# Patient Record
Sex: MALE | Race: ASIAN | NOT HISPANIC OR LATINO | URBAN - METROPOLITAN AREA
[De-identification: names, ages, dates, MRNs, and addresses within clinical notes are randomized per-mention and may not be internally consistent; named-entity substitution may affect disease eponyms.]

---

## 2024-02-23 ENCOUNTER — EMERGENCY (EMERGENCY)
Facility: HOSPITAL | Age: 64
LOS: 1 days | Discharge: ANOTHER HOSPITAL | End: 2024-02-23
Attending: STUDENT IN AN ORGANIZED HEALTH CARE EDUCATION/TRAINING PROGRAM
Payer: MEDICAID

## 2024-02-23 ENCOUNTER — INPATIENT (INPATIENT)
Facility: HOSPITAL | Age: 64
LOS: 5 days | Discharge: HOME | End: 2024-02-29
Attending: SURGERY | Admitting: SURGERY
Payer: COMMERCIAL

## 2024-02-23 VITALS
DIASTOLIC BLOOD PRESSURE: 95 MMHG | TEMPERATURE: 99 F | RESPIRATION RATE: 18 BRPM | SYSTOLIC BLOOD PRESSURE: 150 MMHG | HEART RATE: 68 BPM | OXYGEN SATURATION: 99 %

## 2024-02-23 VITALS
OXYGEN SATURATION: 97 % | HEART RATE: 69 BPM | SYSTOLIC BLOOD PRESSURE: 109 MMHG | TEMPERATURE: 98 F | RESPIRATION RATE: 18 BRPM | DIASTOLIC BLOOD PRESSURE: 68 MMHG

## 2024-02-23 VITALS
HEART RATE: 61 BPM | SYSTOLIC BLOOD PRESSURE: 126 MMHG | TEMPERATURE: 97 F | OXYGEN SATURATION: 99 % | RESPIRATION RATE: 19 BRPM | DIASTOLIC BLOOD PRESSURE: 81 MMHG | WEIGHT: 179.02 LBS

## 2024-02-23 DIAGNOSIS — N40.0 BENIGN PROSTATIC HYPERPLASIA WITHOUT LOWER URINARY TRACT SYMPTOMS: ICD-10-CM

## 2024-02-23 DIAGNOSIS — K80.50 CALCULUS OF BILE DUCT WITHOUT CHOLANGITIS OR CHOLECYSTITIS WITHOUT OBSTRUCTION: ICD-10-CM

## 2024-02-23 DIAGNOSIS — I10 ESSENTIAL (PRIMARY) HYPERTENSION: ICD-10-CM

## 2024-02-23 DIAGNOSIS — E78.5 HYPERLIPIDEMIA, UNSPECIFIED: ICD-10-CM

## 2024-02-23 LAB
ALBUMIN SERPL ELPH-MCNC: 3.9 G/DL — SIGNIFICANT CHANGE UP (ref 3.5–5)
ALP SERPL-CCNC: 96 U/L — SIGNIFICANT CHANGE UP (ref 40–120)
ALT FLD-CCNC: 196 U/L DA — HIGH (ref 10–60)
ANION GAP SERPL CALC-SCNC: 7 MMOL/L — SIGNIFICANT CHANGE UP (ref 5–17)
AST SERPL-CCNC: 319 U/L — HIGH (ref 10–40)
BASOPHILS # BLD AUTO: 0.03 K/UL — SIGNIFICANT CHANGE UP (ref 0–0.2)
BASOPHILS NFR BLD AUTO: 0.3 % — SIGNIFICANT CHANGE UP (ref 0–2)
BILIRUB SERPL-MCNC: 0.9 MG/DL — SIGNIFICANT CHANGE UP (ref 0.2–1.2)
BUN SERPL-MCNC: 30 MG/DL — HIGH (ref 7–18)
CALCIUM SERPL-MCNC: 9.1 MG/DL — SIGNIFICANT CHANGE UP (ref 8.4–10.5)
CHLORIDE SERPL-SCNC: 102 MMOL/L — SIGNIFICANT CHANGE UP (ref 96–108)
CO2 SERPL-SCNC: 26 MMOL/L — SIGNIFICANT CHANGE UP (ref 22–31)
CREAT SERPL-MCNC: 1.3 MG/DL — SIGNIFICANT CHANGE UP (ref 0.5–1.3)
EGFR: 62 ML/MIN/1.73M2 — SIGNIFICANT CHANGE UP
EOSINOPHIL # BLD AUTO: 0.12 K/UL — SIGNIFICANT CHANGE UP (ref 0–0.5)
EOSINOPHIL NFR BLD AUTO: 1.2 % — SIGNIFICANT CHANGE UP (ref 0–6)
GLUCOSE SERPL-MCNC: 130 MG/DL — HIGH (ref 70–99)
HCT VFR BLD CALC: 41.4 % — SIGNIFICANT CHANGE UP (ref 39–50)
HGB BLD-MCNC: 14.1 G/DL — SIGNIFICANT CHANGE UP (ref 13–17)
IMM GRANULOCYTES NFR BLD AUTO: 0.5 % — SIGNIFICANT CHANGE UP (ref 0–0.9)
LIDOCAIN IGE QN: 51 U/L — SIGNIFICANT CHANGE UP (ref 13–75)
LYMPHOCYTES # BLD AUTO: 1.33 K/UL — SIGNIFICANT CHANGE UP (ref 1–3.3)
LYMPHOCYTES # BLD AUTO: 12.8 % — LOW (ref 13–44)
MCHC RBC-ENTMCNC: 30.9 PG — SIGNIFICANT CHANGE UP (ref 27–34)
MCHC RBC-ENTMCNC: 34.1 GM/DL — SIGNIFICANT CHANGE UP (ref 32–36)
MCV RBC AUTO: 90.8 FL — SIGNIFICANT CHANGE UP (ref 80–100)
MONOCYTES # BLD AUTO: 1 K/UL — HIGH (ref 0–0.9)
MONOCYTES NFR BLD AUTO: 9.6 % — SIGNIFICANT CHANGE UP (ref 2–14)
NEUTROPHILS # BLD AUTO: 7.87 K/UL — HIGH (ref 1.8–7.4)
NEUTROPHILS NFR BLD AUTO: 75.6 % — SIGNIFICANT CHANGE UP (ref 43–77)
NRBC # BLD: 0 /100 WBCS — SIGNIFICANT CHANGE UP (ref 0–0)
PLATELET # BLD AUTO: 220 K/UL — SIGNIFICANT CHANGE UP (ref 150–400)
POTASSIUM SERPL-MCNC: 3.5 MMOL/L — SIGNIFICANT CHANGE UP (ref 3.5–5.3)
POTASSIUM SERPL-SCNC: 3.5 MMOL/L — SIGNIFICANT CHANGE UP (ref 3.5–5.3)
PROT SERPL-MCNC: 7.6 G/DL — SIGNIFICANT CHANGE UP (ref 6–8.3)
RBC # BLD: 4.56 M/UL — SIGNIFICANT CHANGE UP (ref 4.2–5.8)
RBC # FLD: 12.3 % — SIGNIFICANT CHANGE UP (ref 10.3–14.5)
SODIUM SERPL-SCNC: 135 MMOL/L — SIGNIFICANT CHANGE UP (ref 135–145)
TROPONIN I, HIGH SENSITIVITY RESULT: 8.8 NG/L — SIGNIFICANT CHANGE UP
WBC # BLD: 10.4 K/UL — SIGNIFICANT CHANGE UP (ref 3.8–10.5)
WBC # FLD AUTO: 10.4 K/UL — SIGNIFICANT CHANGE UP (ref 3.8–10.5)

## 2024-02-23 PROCEDURE — 76705 ECHO EXAM OF ABDOMEN: CPT

## 2024-02-23 PROCEDURE — 80053 COMPREHEN METABOLIC PANEL: CPT

## 2024-02-23 PROCEDURE — 85025 COMPLETE CBC W/AUTO DIFF WBC: CPT

## 2024-02-23 PROCEDURE — 99223 1ST HOSP IP/OBS HIGH 75: CPT

## 2024-02-23 PROCEDURE — 74177 CT ABD & PELVIS W/CONTRAST: CPT | Mod: MC

## 2024-02-23 PROCEDURE — 84484 ASSAY OF TROPONIN QUANT: CPT

## 2024-02-23 PROCEDURE — 76705 ECHO EXAM OF ABDOMEN: CPT | Mod: 26

## 2024-02-23 PROCEDURE — 99285 EMERGENCY DEPT VISIT HI MDM: CPT

## 2024-02-23 PROCEDURE — 74177 CT ABD & PELVIS W/CONTRAST: CPT | Mod: 26,MC

## 2024-02-23 PROCEDURE — 99285 EMERGENCY DEPT VISIT HI MDM: CPT | Mod: 25

## 2024-02-23 PROCEDURE — 99222 1ST HOSP IP/OBS MODERATE 55: CPT | Mod: GC

## 2024-02-23 PROCEDURE — 36415 COLL VENOUS BLD VENIPUNCTURE: CPT

## 2024-02-23 PROCEDURE — 96375 TX/PRO/DX INJ NEW DRUG ADDON: CPT

## 2024-02-23 PROCEDURE — 83690 ASSAY OF LIPASE: CPT

## 2024-02-23 PROCEDURE — 96374 THER/PROPH/DIAG INJ IV PUSH: CPT | Mod: XU

## 2024-02-23 RX ORDER — AMLODIPINE BESYLATE 2.5 MG/1
5 TABLET ORAL DAILY
Refills: 0 | Status: DISCONTINUED | OUTPATIENT
Start: 2024-02-24 | End: 2024-02-29

## 2024-02-23 RX ORDER — ASPIRIN/CALCIUM CARB/MAGNESIUM 324 MG
162 TABLET ORAL DAILY
Refills: 0 | Status: DISCONTINUED | OUTPATIENT
Start: 2024-02-23 | End: 2024-02-26

## 2024-02-23 RX ORDER — TAMSULOSIN HYDROCHLORIDE 0.4 MG/1
0.4 CAPSULE ORAL AT BEDTIME
Refills: 0 | Status: DISCONTINUED | OUTPATIENT
Start: 2024-02-23 | End: 2024-02-29

## 2024-02-23 RX ORDER — ONDANSETRON 8 MG/1
4 TABLET, FILM COATED ORAL EVERY 8 HOURS
Refills: 0 | Status: DISCONTINUED | OUTPATIENT
Start: 2024-02-23 | End: 2024-02-29

## 2024-02-23 RX ORDER — OXYCODONE AND ACETAMINOPHEN 5; 325 MG/1; MG/1
1 TABLET ORAL EVERY 6 HOURS
Refills: 0 | Status: DISCONTINUED | OUTPATIENT
Start: 2024-02-23 | End: 2024-02-29

## 2024-02-23 RX ORDER — AMLODIPINE BESYLATE 2.5 MG/1
1 TABLET ORAL
Refills: 0 | DISCHARGE

## 2024-02-23 RX ORDER — FAMOTIDINE 10 MG/ML
20 INJECTION INTRAVENOUS DAILY
Refills: 0 | Status: DISCONTINUED | OUTPATIENT
Start: 2024-02-23 | End: 2024-02-29

## 2024-02-23 RX ORDER — ATORVASTATIN CALCIUM 80 MG/1
1 TABLET, FILM COATED ORAL
Refills: 0 | DISCHARGE

## 2024-02-23 RX ORDER — PANTOPRAZOLE SODIUM 20 MG/1
40 TABLET, DELAYED RELEASE ORAL ONCE
Refills: 0 | Status: COMPLETED | OUTPATIENT
Start: 2024-02-23 | End: 2024-02-23

## 2024-02-23 RX ORDER — ENOXAPARIN SODIUM 100 MG/ML
40 INJECTION SUBCUTANEOUS EVERY 24 HOURS
Refills: 0 | Status: DISCONTINUED | OUTPATIENT
Start: 2024-02-23 | End: 2024-02-29

## 2024-02-23 RX ORDER — FAMOTIDINE 10 MG/ML
1 INJECTION INTRAVENOUS
Refills: 0 | DISCHARGE

## 2024-02-23 RX ORDER — LANOLIN ALCOHOL/MO/W.PET/CERES
3 CREAM (GRAM) TOPICAL AT BEDTIME
Refills: 0 | Status: DISCONTINUED | OUTPATIENT
Start: 2024-02-23 | End: 2024-02-29

## 2024-02-23 RX ORDER — CEFTRIAXONE 500 MG/1
1000 INJECTION, POWDER, FOR SOLUTION INTRAMUSCULAR; INTRAVENOUS ONCE
Refills: 0 | Status: COMPLETED | OUTPATIENT
Start: 2024-02-23 | End: 2024-02-23

## 2024-02-23 RX ORDER — ACETAMINOPHEN 500 MG
650 TABLET ORAL EVERY 6 HOURS
Refills: 0 | Status: DISCONTINUED | OUTPATIENT
Start: 2024-02-23 | End: 2024-02-29

## 2024-02-23 RX ORDER — SODIUM CHLORIDE 9 MG/ML
1000 INJECTION INTRAMUSCULAR; INTRAVENOUS; SUBCUTANEOUS ONCE
Refills: 0 | Status: COMPLETED | OUTPATIENT
Start: 2024-02-23 | End: 2024-02-23

## 2024-02-23 RX ORDER — TAMSULOSIN HYDROCHLORIDE 0.4 MG/1
1 CAPSULE ORAL
Refills: 0 | DISCHARGE

## 2024-02-23 RX ADMIN — Medication 162 MILLIGRAM(S): at 03:57

## 2024-02-23 RX ADMIN — CEFTRIAXONE 100 MILLIGRAM(S): 500 INJECTION, POWDER, FOR SOLUTION INTRAMUSCULAR; INTRAVENOUS at 10:06

## 2024-02-23 RX ADMIN — TAMSULOSIN HYDROCHLORIDE 0.4 MILLIGRAM(S): 0.4 CAPSULE ORAL at 23:49

## 2024-02-23 RX ADMIN — SODIUM CHLORIDE 1000 MILLILITER(S): 9 INJECTION INTRAMUSCULAR; INTRAVENOUS; SUBCUTANEOUS at 10:07

## 2024-02-23 RX ADMIN — PANTOPRAZOLE SODIUM 40 MILLIGRAM(S): 20 TABLET, DELAYED RELEASE ORAL at 03:57

## 2024-02-23 NOTE — H&P ADULT - HISTORY OF PRESENT ILLNESS
Transfer from [place] for GI evaluation   63 yr old male with a pmh of HTN, HLD BPH who presents with upper abdominal pain/epigastric pain that started at 1am "exploding" in nature 10 in nature radiating to the rest of his abdomen. He has associated SOB with the pain.   Denies  headache, dizziness, chest pain, palpitations, joint pain, diarrhea/constipation, urinary symptoms.  Vitals: T 99.3, HR 68, /95, RR 18 satting 99% RA

## 2024-02-23 NOTE — ED ADULT NURSE NOTE - OBJECTIVE STATEMENT
Patient presented to ED c/o abdominal pain/ epigastric pain- no nausea/ no c/o vomiting, no diarrhea/constipation. Denies chest pain. No SOB.

## 2024-02-23 NOTE — H&P ADULT - NSHPREVIEWOFSYSTEMS_GEN_ALL_CORE
REVIEW OF SYSTEMS:    CONSTITUTIONAL: No weakness, fevers or chills  EYES/ENT: No visual changes;  No dysphagia; No sore throat; No rhinorrhea; No sinus pain/pressure  NECK: No pain or stiffness  RESPIRATORY: No cough, wheezing, hemoptysis; + shortness of breath  CARDIOVASCULAR: No chest pain or palpitations; No lower extremity edema  GASTROINTESTINAL: + abdominal/ epigastric pain. No nausea, vomiting, or hematemesis; No diarrhea or constipation. No melena or hematochezia.  GENITOURINARY: No dysuria, frequency or hematuria  NEUROLOGICAL: No numbness or weakness  MSK: ambulates with out assistance   SKIN: No itching, burning, rashes, or lesions   All other review of systems is negative unless indicated above.

## 2024-02-23 NOTE — H&P ADULT - PROBLEM SELECTOR PLAN 1
New  CT A/P notable for distal CBD stone  GI consulted:  EUS/ERCP tentatively 2/26 pending endoscopy availability  - NPO after MN 2/25  - please order labs (CBC, CMP, INR) for 4 AM on 2/26 so that results will be available early morning; replete lytes and transfuse as needed  - low fat diet as tolerated for now

## 2024-02-23 NOTE — H&P ADULT - NSHPLABSRESULTS_GEN_ALL_CORE
Patent 14.1   10.40 )-----------( 220      ( 23 Feb 2024 04:04 )             41.4     135  |  102  |  30<H>  ----------------------------<  130<H>     02-23  3.5   |  26  |  1.30    Ca    9.1      23 Feb 2024 04:04    TPro  7.6  /  Alb  3.9  /  TBili  0.9  /  DBili  x   /  AST  319<H>  /  ALT  196<H>  /  AlkPhos  96  02-23    images interpreted by radiology:   CT abdo/pelvis: Hepatic steatosis. 5 mm distal CBD stone with minimal biliary dilatation.  RUQ ultrasound: Downstream common bile duct which contained a stone on the CT earlier the   same day is not seen on ultrasound secondary to overlying bowel gas. Visualized common bile duct within normal caliber measuring 5 mm.

## 2024-02-23 NOTE — ED PROVIDER NOTE - CLINICAL SUMMARY MEDICAL DECISION MAKING FREE TEXT BOX
Cantonese speaking otherwise healthy male with no medical issues or surgical history presents to the emergency department as transfer from outside hospital for GI for possible choledocholithiasis.  Patient states that he awoke around 1 AM with sudden onset of epigastric pain radiating bilaterally to upper quadrants into his back, associated nausea but no vomiting, no fevers.  I reviewed the ultrasound, CT scan, labs from outside hospital showing elevated LFTs, no CBD dilation but CT scan showing approximate 5 mm distal CBD stone, ultrasound was performed there was unable to visualize the stone secondary to overlying bowel gas.  Patient received pantoprazole, ceftriaxone at outside hospital, he is currently pain-free and has no tenderness on exam.  Possible passed stone, will repeat ultrasound here as well as repeat LFTs and monitor for any recurrence of pain.  Overall patient very well-appearing, GI consulted.     [place]  ID [id number] Cantonese speaking otherwise healthy male with no medical issues or surgical history presents to the emergency department as transfer from outside hospital for GI for possible choledocholithiasis.  Patient states that he awoke around 1 AM with sudden onset of epigastric pain radiating bilaterally to upper quadrants into his back, associated nausea but no vomiting, no fevers.  I reviewed the ultrasound, CT scan, labs from outside hospital showing elevated LFTs, no CBD dilation but CT scan showing approximate 5 mm distal CBD stone, ultrasound was performed there was unable to visualize the stone secondary to overlying bowel gas.  Patient received pantoprazole, ceftriaxone at outside hospital, he is currently pain-free and has no tenderness on exam.  Possible passed stone, will  repeat LFTs and monitor for any recurrence of pain.  Overall patient very well-appearing, GI consulted.     [place]  ID [id number] Cantonese speaking otherwise healthy male with no medical issues or surgical history presents to the emergency department as transfer from outside hospital for GI for possible choledocholithiasis.  Patient states that he awoke around 1 AM with sudden onset of epigastric pain radiating bilaterally to upper quadrants into his back, associated nausea but no vomiting, no fevers.  I reviewed the ultrasound, CT scan, labs from outside hospital showing elevated LFTs, no CBD dilation but CT scan showing approximate 5 mm distal CBD stone, ultrasound was performed there was unable to visualize the stone secondary to overlying bowel gas.  Patient received pantoprazole, ceftriaxone at outside hospital, he is currently pain-free and has no tenderness on exam.   Overall patient very well-appearing, GI consulted.     ticketea  ID [id number]

## 2024-02-23 NOTE — ED PROVIDER NOTE - PROGRESS NOTE DETAILS
63-year-old male signed out at 7 AM pending consultant recommendations.  Patient has a CBD stone with 5 mm of dilation.  Pain is controlled however no GI is available for ERCP and patient will require transfer.

## 2024-02-23 NOTE — ED PROVIDER NOTE - CLINICAL SUMMARY MEDICAL DECISION MAKING FREE TEXT BOX
patient with epigastric pain, will evel lipase, trop, ekg to eval for CAD and epigastric pain, no RUQ tenderness,     lfts increased so will ct

## 2024-02-23 NOTE — ED PROVIDER NOTE - PHYSICAL EXAMINATION
GEN - NAD; well appearing; A+O x3   HEAD - NC/AT   EYES- PERRL, EOMI  ENT: Airway patent, mmm  PULMONARY - CTA b/l, symmetric breath sounds.   CARDIAC -s1s2, RRR, no M,G,R  ABDOMEN - +BS, ND, NT, soft, no guarding, no rebound, no masses   EXTREMITIES - FROM  NEUROLOGIC - alert, speech clear  PSYCH -nl mood/affect, nl insight.

## 2024-02-23 NOTE — ED ADULT TRIAGE NOTE - WEIGHT IN KG
81.2 Name And Contact Information For Health Care Proxy: lCara Rajput significant other [phone] Name And Contact Information For Health Care Proxy: Clara Rajput significant other [phone]

## 2024-02-23 NOTE — ED ADULT NURSE NOTE - CHIEF COMPLAINT QUOTE
Pt reporting to the ED as a transfer form [facility] for GI consults. Pt reports abdominal pain CT indicating stone in bile duct. pmh HTN, BPH.

## 2024-02-23 NOTE — CONSULT NOTE ADULT - ASSESSMENT
Impression:  #choledocholithiasis seen on CT A/P w/o e/o cholangitis    Recommendations:  - EUS/ERCP tentatively 2/26 pending endoscopy availability  - NPO after MN 2/25  - please order labs (CBC, CMP, INR) for 4 AM on 2/26 so that results will be available early morning; replete lytes and transfuse as needed  - low fat diet as tolerated for now  - antiemetics and pain control per primary team  - monitor for fevers and maintain low threshold for antibiotics  - monitor CMP, CBC daily  - appreciate surgery input re: timing of CCY    **THIS NOTE IS NOT FINALIZED UNTIL SIGNED BY THE ATTENDING**    Maya Sanchez MD  GI Fellow, PGY-6  Available via [employer] Teams    NON-URGENT CONSULTS:  Please email [email or web address]  [email or web address]  AT NIGHT AND ON WEEKENDS:  Contact on-call GI fellow via answering service ([phone]) from 5pm-8am and on weekends/holidays  MONDAY-FRIDAY 8AM-5PM:  Pager# [phone] (JENNIFER) or [phone] ([facility])   62 yo Cantonese speaking M [facility] HLD, HTN p/w acute onset epigastric pain x1 day to [facility] 2/23. Noted to have normal WBC, Normal Tbili/ALP; , . CT A/P notable for distal CBD stone. Was transferred to [facility] for advanced GI eval.     Impression:  #choledocholithiasis seen on CT A/P w/o e/o cholangitis    Recommendations:  - EUS/ERCP tentatively 2/26 pending endoscopy availability  - NPO after MN 2/25  - please order labs (CBC, CMP, INR) for 4 AM on 2/26 so that results will be available early morning; replete lytes and transfuse as needed  - low fat diet as tolerated for now  - antiemetics and pain control per primary team  - monitor for fevers and maintain low threshold for antibiotics  - monitor CMP, CBC daily  - appreciate surgery input re: eval for CCY    **THIS NOTE IS NOT FINALIZED UNTIL SIGNED BY THE ATTENDING**    Maya Sanchez MD  GI Fellow, PGY-6  Available via [employer] Teams    NON-URGENT CONSULTS:  Please email [email or web address]  [email or web address]  AT NIGHT AND ON WEEKENDS:  Contact on-call GI fellow via answering service ([phone]) from 5pm-8am and on weekends/holidays  MONDAY-FRIDAY 8AM-5PM:  Pager# [phone] ([facility]) or [phone] ([facility])

## 2024-02-23 NOTE — ED ADULT TRIAGE NOTE - CHIEF COMPLAINT QUOTE
Pt reporting to the ED as a transfer form [facility] for GI consults. Pt reports abdominal pain CT indicating stone in bile duct. pmh HTN, BPH.

## 2024-02-23 NOTE — CONSULT NOTE ADULT - ASSESSMENT
44 year old male with choledocholithiasis, Tbili WNL, elevated LFTs, Afebrile with no WBC. No signs of cholangitis at this time.     - recommend GI consult for ERCP  - recommend transfer for GI, transfer initiated by ED  - monitor LFT's  - will likely need laparoscopic cholecystectomy after ERCP   - NPO for possible procedure with GI   - discussed with patient and Dr. Giron

## 2024-02-23 NOTE — ED PROVIDER NOTE - OBJECTIVE STATEMENT
63-year-old  male with hypertension high cholesterol presents with epigastric pain.  Patient said he was diagnosed with gastritis and started on famotidine last month however his pain was severe today.  He denies chest pain trouble breathing nausea lightheadedness diaphoresis smoking cigarettes coronary artery disease

## 2024-02-23 NOTE — CONSULT NOTE ADULT - SUBJECTIVE AND OBJECTIVE BOX
Chief Complaint:  Patient is a 63y old  Male who presents with a chief complaint of     HPI: 62 yo Cantonese speaking M PMH HLD, HTN p/w epigastric pain to [facility] 2/23. Noted to have normal WBC, Normal Tbili/ALP; , . CT A/P notable for distal CBD stone. Was transferred to [facility] for advanced GI eval.     Otherwise, patient denies fevers, chills, weight loss, dysphagia, odynophagia, early satiety, poor oral intake, abdominal pain, nausea, vomiting, diarrhea, melena, hematemesis, hematochezia, change in stool caliber, or family history of GI-related cancers.    Allergies:  No Known Allergies      Home Medications:    Hospital Medications:      PMHX/PSHX:      Family history:   Denies any family history of GI-related disease or cancers.    Social History:   ETOH: denies  Tobacco: denies  Illicit drug use: denies    ROS: 14 point ROS negative unless otherwise stated in HPI      Vital Signs:  Vital Signs Last 24 Hrs  T(C): 37.4 (23 Feb 2024 15:12), Max: 37.4 (23 Feb 2024 15:12)  T(F): 99.3 (23 Feb 2024 15:12), Max: 99.3 (23 Feb 2024 15:12)  HR: 68 (23 Feb 2024 15:12) (61 - 74)  BP: 150/95 (23 Feb 2024 15:12) (109/68 - 150/95)  BP(mean): --  RR: 18 (23 Feb 2024 15:12) (18 - 19)  SpO2: 99% (23 Feb 2024 15:12) (97% - 99%)    Parameters below as of 23 Feb 2024 15:12  Patient On (Oxygen Delivery Method): room air      Daily     Daily     PHYSICAL EXAM:     GENERAL:  Appears stated age, well-groomed, well-nourished, no distress  HEENT:  NC/AT,  conjunctivae clear and pink  CHEST:  Full & symmetric excursion, no increased effort  HEART:  Regular rhythm, S1, S2, no murmur/rub/S3/S4  ABDOMEN:  Soft, +epigastric-tender, non-distended  EXTREMITIES:  no cyanosis,clubbing or edema  SKIN:  No rash/erythema/ecchymoses/petechiae/wounds/abscess/warm/dry  NEURO:  Alert, orientedx3      LABS:                        14.1   10.40 )-----------( 220      ( 23 Feb 2024 04:04 )             41.4     02-23    135  |  102  |  30<H>  ----------------------------<  130<H>  3.5   |  26  |  1.30    Ca    9.1      23 Feb 2024 04:04    TPro  7.6  /  Alb  3.9  /  TBili  0.9  /  DBili  x   /  AST  319<H>  /  ALT  196<H>  /  AlkPhos  96  02-23    LIVER FUNCTIONS - ( 23 Feb 2024 04:04 )  Alb: 3.9 g/dL / Pro: 7.6 g/dL / ALK PHOS: 96 U/L / ALT: 196 U/L DA / AST: 319 U/L / GGT: x             Urinalysis Basic - ( 23 Feb 2024 04:04 )    Color: x / Appearance: x / SG: x / pH: x  Gluc: 130 mg/dL / Ketone: x  / Bili: x / Urobili: x   Blood: x / Protein: x / Nitrite: x   Leuk Esterase: x / RBC: x / WBC x   Sq Epi: x / Non Sq Epi: x / Bacteria: x      Amylase Serum--      Lipase serum51       Ammonia--      Imaging:   ACC: [id number] EXAM:  US ABDOMEN RT UPR QUADRANT   ORDERED BY: MARGARET PALMA     PROCEDURE DATE:  02/23/2024          INTERPRETATION:  CLINICAL INFORMATION: CBD stone. Elevated LFTs.    COMPARISON: CT abdomen/pelvis of the same day.    TECHNIQUE: Sonography of the right upper quadrant. The examination is   technically limited secondary to overlying bowel gas.    FINDINGS:  Liver: Within normal limits.  Bile ducts: Normal caliber. Common bile duct measures 5 mm. The   downstream common bile duct in the region of the ampulla and not seen   secondary to overlying bowel gas.  Gallbladder: Within normal limits.  Pancreas: Limited. Visualized portions are within normal limits.  Right kidney: 10.7 cm. No hydronephrosis.  Ascites: None.  IVC andaorta: Visualized portions normal in caliber.    IMPRESSION:    Downstream common bile duct which contained a stone on the CT earlier the   same day is not seen on ultrasound secondary to overlying bowel gas.    Visualized common bile duct within normal caliber measuring 5 mm.        ACC: [id number] EXAM:  CT ABDOMEN AND PELVIS IC   ORDERED BY: MARGARET PALMA     PROCEDURE DATE:  02/23/2024          INTERPRETATION:  CLINICAL INFORMATION: Epigastric pain, elevated LFTs    COMPARISON: None.    CONTRAST/COMPLICATIONS:  IV Contrast: Omnipaque 350  90 cc administered   10 cc discarded  Oral Contrast: NONE  Complications: None reported at time of study completion    PROCEDURE:  CT of the Abdomen and Pelvis was performed.  Sagittal and coronal reformats were performed.    FINDINGS:  LOWER CHEST: Within normal limits.    LIVER: Hepatic steatosis. Multiple tiny hypodense lesions too small to   characterize.  BILE DUCTS: Minimal intrahepatic biliary dilatation. 5 mm sized calcific   density in the distal common bile duct (6-80).  GALLBLADDER: Within normal limits.  SPLEEN: Within normal limits.  PANCREAS: Within normal limits.  ADRENALS: Within normal limits.  KIDNEYS/URETERS: Tiny right renal cysts.    BLADDER: Within normal limits.  REPRODUCTIVE ORGANS: Mild prostatic enlargement.    BOWEL: No bowel obstruction. Appendix is normal.  PERITONEUM: No ascites.  VESSELS: Minimal atherosclerotic disease.  RETROPERITONEUM/LYMPH NODES: No lymphadenopathy.  ABDOMINAL WALL: Within normal limits.  BONES: Discogenic disease most prominent at L3-4 and L4-5.    IMPRESSION:  Hepatic steatosis.  5 mm distal CBD stone with minimal biliary dilatation.       Chief Complaint:  Patient is a 63y old  Male who presents with a chief complaint of     HPI: 62 yo Cantonese speaking M PMH HLD, HTN p/w acute onset epigastric pain x1 day to [facility] 2/23. Noted to have normal WBC, Normal Tbili/ALP; , . CT A/P notable for distal CBD stone. Was transferred to [facility] for advanced GI eval.     Otherwise, patient denies fevers, chills, weight loss, dysphagia, odynophagia, early satiety, poor oral intake, abdominal pain, nausea, vomiting, diarrhea, melena, hematemesis, hematochezia, change in stool caliber, or family history of GI-related cancers.    Allergies:  No Known Allergies      Home Medications:    Hospital Medications:      PMHX/PSHX:      Family history:   Denies any family history of GI-related disease or cancers.    Social History:   ETOH: denies  Tobacco: denies  Illicit drug use: denies    ROS: 14 point ROS negative unless otherwise stated in HPI      Vital Signs:  Vital Signs Last 24 Hrs  T(C): 37.4 (23 Feb 2024 15:12), Max: 37.4 (23 Feb 2024 15:12)  T(F): 99.3 (23 Feb 2024 15:12), Max: 99.3 (23 Feb 2024 15:12)  HR: 68 (23 Feb 2024 15:12) (61 - 74)  BP: 150/95 (23 Feb 2024 15:12) (109/68 - 150/95)  BP(mean): --  RR: 18 (23 Feb 2024 15:12) (18 - 19)  SpO2: 99% (23 Feb 2024 15:12) (97% - 99%)    Parameters below as of 23 Feb 2024 15:12  Patient On (Oxygen Delivery Method): room air      Daily     Daily     PHYSICAL EXAM:     GENERAL:  Appears stated age, well-groomed, well-nourished, no distress  HEENT:  NC/AT,  conjunctivae clear and pink  CHEST:  Full & symmetric excursion, no increased effort  HEART:  Regular rhythm, S1, S2, no murmur/rub/S3/S4  ABDOMEN:  Soft, +epigastric-tender, non-distended  EXTREMITIES:  no cyanosis,clubbing or edema  SKIN:  No rash/erythema/ecchymoses/petechiae/wounds/abscess/warm/dry  NEURO:  Alert, orientedx3      LABS:                        14.1   10.40 )-----------( 220      ( 23 Feb 2024 04:04 )             41.4     02-23    135  |  102  |  30<H>  ----------------------------<  130<H>  3.5   |  26  |  1.30    Ca    9.1      23 Feb 2024 04:04    TPro  7.6  /  Alb  3.9  /  TBili  0.9  /  DBili  x   /  AST  319<H>  /  ALT  196<H>  /  AlkPhos  96  02-23    LIVER FUNCTIONS - ( 23 Feb 2024 04:04 )  Alb: 3.9 g/dL / Pro: 7.6 g/dL / ALK PHOS: 96 U/L / ALT: 196 U/L DA / AST: 319 U/L / GGT: x             Urinalysis Basic - ( 23 Feb 2024 04:04 )    Color: x / Appearance: x / SG: x / pH: x  Gluc: 130 mg/dL / Ketone: x  / Bili: x / Urobili: x   Blood: x / Protein: x / Nitrite: x   Leuk Esterase: x / RBC: x / WBC x   Sq Epi: x / Non Sq Epi: x / Bacteria: x      Amylase Serum--      Lipase serum51       Ammonia--      Imaging:   ACC: [id number] EXAM:  US ABDOMEN RT UPR QUADRANT   ORDERED BY: MARGARET PALMA     PROCEDURE DATE:  02/23/2024          INTERPRETATION:  CLINICAL INFORMATION: CBD stone. Elevated LFTs.    COMPARISON: CT abdomen/pelvis of the same day.    TECHNIQUE: Sonography of the right upper quadrant. The examination is   technically limited secondary to overlying bowel gas.    FINDINGS:  Liver: Within normal limits.  Bile ducts: Normal caliber. Common bile duct measures 5 mm. The   downstream common bile duct in the region of the ampulla and not seen   secondary to overlying bowel gas.  Gallbladder: Within normal limits.  Pancreas: Limited. Visualized portions are within normal limits.  Right kidney: 10.7 cm. No hydronephrosis.  Ascites: None.  IVC andaorta: Visualized portions normal in caliber.    IMPRESSION:    Downstream common bile duct which contained a stone on the CT earlier the   same day is not seen on ultrasound secondary to overlying bowel gas.    Visualized common bile duct within normal caliber measuring 5 mm.        ACC: [id number] EXAM:  CT ABDOMEN AND PELVIS IC   ORDERED BY: MARGARET PALMA     PROCEDURE DATE:  02/23/2024          INTERPRETATION:  CLINICAL INFORMATION: Epigastric pain, elevated LFTs    COMPARISON: None.    CONTRAST/COMPLICATIONS:  IV Contrast: Omnipaque 350  90 cc administered   10 cc discarded  Oral Contrast: NONE  Complications: None reported at time of study completion    PROCEDURE:  CT of the Abdomen and Pelvis was performed.  Sagittal and coronal reformats were performed.    FINDINGS:  LOWER CHEST: Within normal limits.    LIVER: Hepatic steatosis. Multiple tiny hypodense lesions too small to   characterize.  BILE DUCTS: Minimal intrahepatic biliary dilatation. 5 mm sized calcific   density in the distal common bile duct (6-80).  GALLBLADDER: Within normal limits.  SPLEEN: Within normal limits.  PANCREAS: Within normal limits.  ADRENALS: Within normal limits.  KIDNEYS/URETERS: Tiny right renal cysts.    BLADDER: Within normal limits.  REPRODUCTIVE ORGANS: Mild prostatic enlargement.    BOWEL: No bowel obstruction. Appendix is normal.  PERITONEUM: No ascites.  VESSELS: Minimal atherosclerotic disease.  RETROPERITONEUM/LYMPH NODES: No lymphadenopathy.  ABDOMINAL WALL: Within normal limits.  BONES: Discogenic disease most prominent at L3-4 and L4-5.    IMPRESSION:  Hepatic steatosis.  5 mm distal CBD stone with minimal biliary dilatation.       Chief Complaint:  Patient is a 63y old  Male who presents with a chief complaint of     Cantonese  #[id number] was used  HPI: 64 yo Cantonese speaking M PMH HLD, HTN p/w acute onset epigastric pain x1 day to [facility] 2/23. Noted to have normal WBC, Normal Tbili/ALP; , . CT A/P notable for distal CBD stone. Was transferred to [facility] for advanced GI eval.     Otherwise, patient denies fevers, chills, weight loss, dysphagia, odynophagia, early satiety, poor oral intake, abdominal pain, nausea, vomiting, diarrhea, melena, hematemesis, hematochezia, change in stool caliber, or family history of GI-related cancers.    Allergies:  No Known Allergies      Home Medications:    Hospital Medications:      PMHX/PSHX:      Family history:   Denies any family history of GI-related disease or cancers.    Social History:   ETOH: denies  Tobacco: denies  Illicit drug use: denies    ROS: 14 point ROS negative unless otherwise stated in HPI      Vital Signs:  Vital Signs Last 24 Hrs  T(C): 37.4 (23 Feb 2024 15:12), Max: 37.4 (23 Feb 2024 15:12)  T(F): 99.3 (23 Feb 2024 15:12), Max: 99.3 (23 Feb 2024 15:12)  HR: 68 (23 Feb 2024 15:12) (61 - 74)  BP: 150/95 (23 Feb 2024 15:12) (109/68 - 150/95)  BP(mean): --  RR: 18 (23 Feb 2024 15:12) (18 - 19)  SpO2: 99% (23 Feb 2024 15:12) (97% - 99%)    Parameters below as of 23 Feb 2024 15:12  Patient On (Oxygen Delivery Method): room air      Daily     Daily     PHYSICAL EXAM:     GENERAL:  Appears stated age, well-groomed, well-nourished, no distress  HEENT:  NC/AT,  conjunctivae clear and pink  CHEST:  Full & symmetric excursion, no increased effort  HEART:  Regular rhythm, S1, S2, no murmur/rub/S3/S4  ABDOMEN:  Soft, +epigastric-tender, non-distended  EXTREMITIES:  no cyanosis,clubbing or edema  SKIN:  No rash/erythema/ecchymoses/petechiae/wounds/abscess/warm/dry  NEURO:  Alert, orientedx3      LABS:                        14.1   10.40 )-----------( 220      ( 23 Feb 2024 04:04 )             41.4     02-23    135  |  102  |  30<H>  ----------------------------<  130<H>  3.5   |  26  |  1.30    Ca    9.1      23 Feb 2024 04:04    TPro  7.6  /  Alb  3.9  /  TBili  0.9  /  DBili  x   /  AST  319<H>  /  ALT  196<H>  /  AlkPhos  96  02-23    LIVER FUNCTIONS - ( 23 Feb 2024 04:04 )  Alb: 3.9 g/dL / Pro: 7.6 g/dL / ALK PHOS: 96 U/L / ALT: 196 U/L DA / AST: 319 U/L / GGT: x             Urinalysis Basic - ( 23 Feb 2024 04:04 )    Color: x / Appearance: x / SG: x / pH: x  Gluc: 130 mg/dL / Ketone: x  / Bili: x / Urobili: x   Blood: x / Protein: x / Nitrite: x   Leuk Esterase: x / RBC: x / WBC x   Sq Epi: x / Non Sq Epi: x / Bacteria: x      Amylase Serum--      Lipase serum51       Ammonia--      Imaging:   ACC: [id number] EXAM:  US ABDOMEN RT UPR QUADRANT   ORDERED BY: MARGARET PALMA     PROCEDURE DATE:  02/23/2024          INTERPRETATION:  CLINICAL INFORMATION: CBD stone. Elevated LFTs.    COMPARISON: CT abdomen/pelvis of the same day.    TECHNIQUE: Sonography of the right upper quadrant. The examination is   technically limited secondary to overlying bowel gas.    FINDINGS:  Liver: Within normal limits.  Bile ducts: Normal caliber. Common bile duct measures 5 mm. The   downstream common bile duct in the region of the ampulla and not seen   secondary to overlying bowel gas.  Gallbladder: Within normal limits.  Pancreas: Limited. Visualized portions are within normal limits.  Right kidney: 10.7 cm. No hydronephrosis.  Ascites: None.  IVC andaorta: Visualized portions normal in caliber.    IMPRESSION:    Downstream common bile duct which contained a stone on the CT earlier the   same day is not seen on ultrasound secondary to overlying bowel gas.    Visualized common bile duct within normal caliber measuring 5 mm.        ACC: [id number] EXAM:  CT ABDOMEN AND PELVIS IC   ORDERED BY: MARGARET PALMA     PROCEDURE DATE:  02/23/2024          INTERPRETATION:  CLINICAL INFORMATION: Epigastric pain, elevated LFTs    COMPARISON: None.    CONTRAST/COMPLICATIONS:  IV Contrast: Omnipaque 350  90 cc administered   10 cc discarded  Oral Contrast: NONE  Complications: None reported at time of study completion    PROCEDURE:  CT of the Abdomen and Pelvis was performed.  Sagittal and coronal reformats were performed.    FINDINGS:  LOWER CHEST: Within normal limits.    LIVER: Hepatic steatosis. Multiple tiny hypodense lesions too small to   characterize.  BILE DUCTS: Minimal intrahepatic biliary dilatation. 5 mm sized calcific   density in the distal common bile duct (6-80).  GALLBLADDER: Within normal limits.  SPLEEN: Within normal limits.  PANCREAS: Within normal limits.  ADRENALS: Within normal limits.  KIDNEYS/URETERS: Tiny right renal cysts.    BLADDER: Within normal limits.  REPRODUCTIVE ORGANS: Mild prostatic enlargement.    BOWEL: No bowel obstruction. Appendix is normal.  PERITONEUM: No ascites.  VESSELS: Minimal atherosclerotic disease.  RETROPERITONEUM/LYMPH NODES: No lymphadenopathy.  ABDOMINAL WALL: Within normal limits.  BONES: Discogenic disease most prominent at L3-4 and L4-5.    IMPRESSION:  Hepatic steatosis.  5 mm distal CBD stone with minimal biliary dilatation.

## 2024-02-23 NOTE — CONSULT NOTE ADULT - SUBJECTIVE AND OBJECTIVE BOX
HPI:  63 year old male with PMH HTN, HLD, BPH, and gastritis presents to ED due to sever epigastric pain. He denies current abdominal pain, nausea, vomiting, fever, chills, jaundice. Denies PSH.     Interpretter: Gianni Sena [id number] - Martin    PAST MEDICAL & SURGICAL HISTORY:    Review of Systems: Contained within HPI     MEDICATIONS  (STANDING):  aspirin  chewable 162 milliGRAM(s) Oral daily    MEDICATIONS  (PRN):    Allergies: No Known Allergies    FAMILY HISTORY:  Vital Signs Last 24 Hrs  T(C): 37.1 (23 Feb 2024 07:51), Max: 37.1 (23 Feb 2024 07:51)  T(F): 98.7 (23 Feb 2024 07:51), Max: 98.7 (23 Feb 2024 07:51)  HR: 73 (23 Feb 2024 07:51) (61 - 73)  BP: 134/84 (23 Feb 2024 07:51) (126/81 - 134/84)  RR: 18 (23 Feb 2024 07:51) (18 - 19)  SpO2: 97% (23 Feb 2024 07:51) (97% - 99%)  Parameters below as of 23 Feb 2024 03:28  Patient On (Oxygen Delivery Method): room air    Physical Exam:  General:  Appears stated age, well-groomed, well-nourished, no distress  Eyes: EOMI  HENT:  WNL, no JVD  Chest: respirations nonlabored  Cardiovascular:  Regular rate & rhythm  Abdomen:  soft, NT/ND  Extremities: no edema bilaterally  Skin: warm and dry  Musculoskeletal: no calf tenderness  Neuro:  Alert, oriented to time, place and person   Psych: normal affect    LABS:                        14.1   10.40 )-----------( 220      ( 23 Feb 2024 04:04 )             41.4     02-23    135  |  102  |  30<H>  ----------------------------<  130<H>  3.5   |  26  |  1.30    Ca    9.1      23 Feb 2024 04:04    TPro  7.6  /  Alb  3.9  /  TBili  0.9  /  DBili  x   /  AST  319<H>  /  ALT  196<H>  /  AlkPhos  96  02-23    Urinalysis Basic - ( 23 Feb 2024 04:04 )    Color: x / Appearance: x / SG: x / pH: x  Gluc: 130 mg/dL / Ketone: x  / Bili: x / Urobili: x   Blood: x / Protein: x / Nitrite: x   Leuk Esterase: x / RBC: x / WBC x   Sq Epi: x / Non Sq Epi: x / Bacteria: x    RADIOLOGY & ADDITIONAL STUDIES:  < from: CT Abdomen and Pelvis w/ IV Cont (02.23.24 @ 06:07) >    IMPRESSION:  Hepatic steatosis.  5 mm distal CBD stone with minimal biliary dilatation.    --- End of Report ---    SOCO TAI MD; Attending Radiologist  This document has been electronically signed. Feb 23 2024  6:44AM    < end of copied text >

## 2024-02-23 NOTE — ED ADULT NURSE NOTE - OBJECTIVE STATEMENT
Pt received to intake, awake and alert, A&OX4, ambulatory. Transfer for "5mm CBD stone with dilation of CBD" per pre-arrival note.  Reports intermittent N/V. Woke up this morning around 1 am with acute onset epigastric pain. Received ceftriaxone and pantoprazole at other facility. Respirations even and unlabored. Denies CP, SOB, HA, dizziness, palpitations, blurry vision. Bed in lowest position, call bell within reach. Safety maintained.

## 2024-02-23 NOTE — H&P ADULT - NSICDXPASTMEDICALHX_GEN_ALL_CORE_FT
PAST MEDICAL HISTORY:  Benign essential HTN     BPH (benign prostatic hyperplasia)     HLD (hyperlipidemia)

## 2024-02-23 NOTE — ED ADULT NURSE NOTE - NSFALLUNIVINTERV_ED_ALL_ED
Bed/Stretcher in lowest position, wheels locked, appropriate side rails in place/Call bell, personal items and telephone in reach/Instruct patient to call for assistance before getting out of bed/chair/stretcher/Non-slip footwear applied when patient is off stretcher/[place] to call system/Physically safe environment - no spills, clutter or unnecessary equipment/Purposeful proactive rounding/Room/bathroom lighting operational, light cord in reach

## 2024-02-24 LAB
A1C WITH ESTIMATED AVERAGE GLUCOSE RESULT: 5.7 % — HIGH (ref 4–5.6)
ALBUMIN SERPL ELPH-MCNC: 4.2 G/DL — SIGNIFICANT CHANGE UP (ref 3.3–5)
ALP SERPL-CCNC: 164 U/L — HIGH (ref 40–120)
ALT FLD-CCNC: 381 U/L — HIGH (ref 4–41)
ANION GAP SERPL CALC-SCNC: 12 MMOL/L — SIGNIFICANT CHANGE UP (ref 7–14)
AST SERPL-CCNC: 269 U/L — HIGH (ref 4–40)
BASOPHILS # BLD AUTO: 0.02 K/UL — SIGNIFICANT CHANGE UP (ref 0–0.2)
BASOPHILS NFR BLD AUTO: 0.4 % — SIGNIFICANT CHANGE UP (ref 0–2)
BILIRUB SERPL-MCNC: 2.2 MG/DL — HIGH (ref 0.2–1.2)
BUN SERPL-MCNC: 16 MG/DL — SIGNIFICANT CHANGE UP (ref 7–23)
CALCIUM SERPL-MCNC: 9.2 MG/DL — SIGNIFICANT CHANGE UP (ref 8.4–10.5)
CHLORIDE SERPL-SCNC: 104 MMOL/L — SIGNIFICANT CHANGE UP (ref 98–107)
CHOLEST SERPL-MCNC: 144 MG/DL — SIGNIFICANT CHANGE UP
CO2 SERPL-SCNC: 24 MMOL/L — SIGNIFICANT CHANGE UP (ref 22–31)
CREAT SERPL-MCNC: 1.15 MG/DL — SIGNIFICANT CHANGE UP (ref 0.5–1.3)
EGFR: 72 ML/MIN/1.73M2 — SIGNIFICANT CHANGE UP
EOSINOPHIL # BLD AUTO: 0.27 K/UL — SIGNIFICANT CHANGE UP (ref 0–0.5)
EOSINOPHIL NFR BLD AUTO: 5.2 % — SIGNIFICANT CHANGE UP (ref 0–6)
ESTIMATED AVERAGE GLUCOSE: 117 — SIGNIFICANT CHANGE UP
GLUCOSE SERPL-MCNC: 113 MG/DL — HIGH (ref 70–99)
HCT VFR BLD CALC: 40.6 % — SIGNIFICANT CHANGE UP (ref 39–50)
HCV AB S/CO SERPL IA: 0.12 S/CO — SIGNIFICANT CHANGE UP (ref 0–0.99)
HCV AB SERPL-IMP: SIGNIFICANT CHANGE UP
HDLC SERPL-MCNC: 48 MG/DL — SIGNIFICANT CHANGE UP
HGB BLD-MCNC: 13.9 G/DL — SIGNIFICANT CHANGE UP (ref 13–17)
IANC: 3.55 K/UL — SIGNIFICANT CHANGE UP (ref 1.8–7.4)
IMM GRANULOCYTES NFR BLD AUTO: 0.4 % — SIGNIFICANT CHANGE UP (ref 0–0.9)
LIPID PNL WITH DIRECT LDL SERPL: 73 MG/DL — SIGNIFICANT CHANGE UP
LYMPHOCYTES # BLD AUTO: 0.82 K/UL — LOW (ref 1–3.3)
LYMPHOCYTES # BLD AUTO: 15.8 % — SIGNIFICANT CHANGE UP (ref 13–44)
MCHC RBC-ENTMCNC: 30.5 PG — SIGNIFICANT CHANGE UP (ref 27–34)
MCHC RBC-ENTMCNC: 34.2 GM/DL — SIGNIFICANT CHANGE UP (ref 32–36)
MCV RBC AUTO: 89 FL — SIGNIFICANT CHANGE UP (ref 80–100)
MONOCYTES # BLD AUTO: 0.52 K/UL — SIGNIFICANT CHANGE UP (ref 0–0.9)
MONOCYTES NFR BLD AUTO: 10 % — SIGNIFICANT CHANGE UP (ref 2–14)
NEUTROPHILS # BLD AUTO: 3.55 K/UL — SIGNIFICANT CHANGE UP (ref 1.8–7.4)
NEUTROPHILS NFR BLD AUTO: 68.2 % — SIGNIFICANT CHANGE UP (ref 43–77)
NON HDL CHOLESTEROL: 96 MG/DL — SIGNIFICANT CHANGE UP
NRBC # BLD: 0 /100 WBCS — SIGNIFICANT CHANGE UP (ref 0–0)
NRBC # FLD: 0 K/UL — SIGNIFICANT CHANGE UP (ref 0–0)
PLATELET # BLD AUTO: 230 K/UL — SIGNIFICANT CHANGE UP (ref 150–400)
POTASSIUM SERPL-MCNC: 3.6 MMOL/L — SIGNIFICANT CHANGE UP (ref 3.5–5.3)
POTASSIUM SERPL-SCNC: 3.6 MMOL/L — SIGNIFICANT CHANGE UP (ref 3.5–5.3)
PROT SERPL-MCNC: 6.8 G/DL — SIGNIFICANT CHANGE UP (ref 6–8.3)
RBC # BLD: 4.56 M/UL — SIGNIFICANT CHANGE UP (ref 4.2–5.8)
RBC # FLD: 12.8 % — SIGNIFICANT CHANGE UP (ref 10.3–14.5)
SODIUM SERPL-SCNC: 140 MMOL/L — SIGNIFICANT CHANGE UP (ref 135–145)
TRIGL SERPL-MCNC: 116 MG/DL — SIGNIFICANT CHANGE UP
WBC # BLD: 5.2 K/UL — SIGNIFICANT CHANGE UP (ref 3.8–10.5)
WBC # FLD AUTO: 5.2 K/UL — SIGNIFICANT CHANGE UP (ref 3.8–10.5)

## 2024-02-24 PROCEDURE — 99232 SBSQ HOSP IP/OBS MODERATE 35: CPT

## 2024-02-24 RX ADMIN — TAMSULOSIN HYDROCHLORIDE 0.4 MILLIGRAM(S): 0.4 CAPSULE ORAL at 21:44

## 2024-02-24 RX ADMIN — AMLODIPINE BESYLATE 5 MILLIGRAM(S): 2.5 TABLET ORAL at 05:43

## 2024-02-24 RX ADMIN — ENOXAPARIN SODIUM 40 MILLIGRAM(S): 100 INJECTION SUBCUTANEOUS at 05:43

## 2024-02-24 RX ADMIN — FAMOTIDINE 20 MILLIGRAM(S): 10 INJECTION INTRAVENOUS at 12:48

## 2024-02-24 NOTE — PROGRESS NOTE ADULT - SUBJECTIVE AND OBJECTIVE BOX
[facility] Division of Hospital Medicine  Pepe Adair MD  Pager (S-F, 9N-6V): [phone]  Other Times:  [phone]    Patient is a 63y old  Male who presents with a chief complaint of choledocholithiasis (23 Feb 2024 20:12)      SUBJECTIVE / OVERNIGHT EVENTS: + BM this AM; afebrile; chinese speaking used translation application on phone to communicate        MEDICATIONS  (STANDING):  amLODIPine   Tablet 5 milliGRAM(s) Oral daily  enoxaparin Injectable 40 milliGRAM(s) SubCutaneous every 24 hours  famotidine    Tablet 20 milliGRAM(s) Oral daily  tamsulosin 0.4 milliGRAM(s) Oral at bedtime    MEDICATIONS  (PRN):  acetaminophen     Tablet .. 650 milliGRAM(s) Oral every 6 hours PRN Temp greater or equal to 38C (100.4F), Mild Pain (1 - 3)  aluminum hydroxide/magnesium hydroxide/simethicone Suspension 30 milliLiter(s) Oral every 4 hours PRN Dyspepsia  melatonin 3 milliGRAM(s) Oral at bedtime PRN Insomnia  ondansetron Injectable 4 milliGRAM(s) IV Push every 8 hours PRN Nausea and/or Vomiting  oxycodone    5 mG/acetaminophen 325 mG 1 Tablet(s) Oral every 6 hours PRN Severe Pain (7 - 10)      CAPILLARY BLOOD GLUCOSE        I&O's Summary    23 Feb 2024 07:01  -  24 Feb 2024 07:00  --------------------------------------------------------  IN: 0 mL / OUT: 0 mL / NET: 0 mL        PHYSICAL EXAM:  Vital Signs Last 24 Hrs  T(C): 36.9 (24 Feb 2024 10:00), Max: 37.4 (23 Feb 2024 15:12)  T(F): 98.5 (24 Feb 2024 10:00), Max: 99.3 (23 Feb 2024 15:12)  HR: 64 (24 Feb 2024 10:00) (59 - 80)  BP: 131/84 (24 Feb 2024 10:00) (108/84 - 158/96)  BP(mean): --  RR: 18 (24 Feb 2024 10:00) (16 - 18)  SpO2: 98% (24 Feb 2024 10:00) (97% - 100%)    Parameters below as of 24 Feb 2024 10:00  Patient On (Oxygen Delivery Method): room air      CONSTITUTIONAL: NAD  EYES: conjunctiva and sclera clear  NECK: Supple,  RESPIRATORY: Normal respiratory effort; lungs are clear to auscultation bilaterally  CARDIOVASCULAR: Regular rate and rhythm, normal S1 and S2, no murmur/rub/gallop; No lower extremity edema; Peripheral pulses are 2+ bilaterally  ABDOMEN: Nontender to palpation, normoactive bowel sounds,   PSYCH: awake alert answers questions appropriately  NEUROLOGY: CN 2-12 are intact and symmetric; no gross sensory deficits       LABS:                        13.9   5.20  )-----------( 230      ( 24 Feb 2024 05:45 )             40.6     02-24    140  |  104  |  16  ----------------------------<  113<H>  3.6   |  24  |  1.15    Ca    9.2      24 Feb 2024 05:45    TPro  6.8  /  Alb  4.2  /  TBili  2.2<H>  /  DBili  x   /  AST  269<H>  /  ALT  381<H>  /  AlkPhos  164<H>  02-24          Urinalysis Basic - ( 24 Feb 2024 05:45 )    Color: x / Appearance: x / SG: x / pH: x  Gluc: 113 mg/dL / Ketone: x  / Bili: x / Urobili: x   Blood: x / Protein: x / Nitrite: x   Leuk Esterase: x / RBC: x / WBC x   Sq Epi: x / Non Sq Epi: x / Bacteria: x          RADIOLOGY & ADDITIONAL TESTS:  Results Reviewed: < from: US Abdomen Upper Quadrant Right (02.23.24 @ 10:37) >  IMPRESSION:    Downstream common bile duct which contained a stone on the CT earlier the   same day is not seen on ultrasound secondary to overlying bowel gas.    Visualized common bile duct within normal caliber measuring 5 mm.    < end of copied text >  < from: CT Abdomen and Pelvis w/ IV Cont (02.23.24 @ 06:07) >  IMPRESSION:  Hepatic steatosis.  5 mm distal CBD stone with minimal biliary dilatation.    < end of copied text >      Imaging Personally Reviewed:  Electrocardiogram Personally Reviewed:    COORDINATION OF CARE:  Care Discussed with Consultants/Other Providers [Y/N]:  Prior or Outpatient Records Reviewed [Y/N]:

## 2024-02-24 NOTE — PROGRESS NOTE ADULT - PROBLEM SELECTOR PLAN 1
- CT A/P notable for distal CBD stone  - NPO after MN 2/25  - please order labs (CBC, CMP, INR) for 4 AM on 2/26 so that results will be available early morning; replete lytes and transfuse as needed  - low fat diet as tolerated for now  - LFTs rising   - GI consulted:  EUS/ERCP tentatively 2/26 pending endoscopy availability

## 2024-02-25 LAB
ALBUMIN SERPL ELPH-MCNC: 4.4 G/DL — SIGNIFICANT CHANGE UP (ref 3.3–5)
ALP SERPL-CCNC: 201 U/L — HIGH (ref 40–120)
ALT FLD-CCNC: 337 U/L — HIGH (ref 4–41)
ANION GAP SERPL CALC-SCNC: 12 MMOL/L — SIGNIFICANT CHANGE UP (ref 7–14)
AST SERPL-CCNC: 154 U/L — HIGH (ref 4–40)
BILIRUB SERPL-MCNC: 1.2 MG/DL — SIGNIFICANT CHANGE UP (ref 0.2–1.2)
BUN SERPL-MCNC: 18 MG/DL — SIGNIFICANT CHANGE UP (ref 7–23)
CALCIUM SERPL-MCNC: 9.3 MG/DL — SIGNIFICANT CHANGE UP (ref 8.4–10.5)
CHLORIDE SERPL-SCNC: 101 MMOL/L — SIGNIFICANT CHANGE UP (ref 98–107)
CO2 SERPL-SCNC: 24 MMOL/L — SIGNIFICANT CHANGE UP (ref 22–31)
CREAT SERPL-MCNC: 1.21 MG/DL — SIGNIFICANT CHANGE UP (ref 0.5–1.3)
EGFR: 67 ML/MIN/1.73M2 — SIGNIFICANT CHANGE UP
GLUCOSE SERPL-MCNC: 101 MG/DL — HIGH (ref 70–99)
MAGNESIUM SERPL-MCNC: 2.4 MG/DL — SIGNIFICANT CHANGE UP (ref 1.6–2.6)
PHOSPHATE SERPL-MCNC: 4.1 MG/DL — SIGNIFICANT CHANGE UP (ref 2.5–4.5)
POTASSIUM SERPL-MCNC: 4.2 MMOL/L — SIGNIFICANT CHANGE UP (ref 3.5–5.3)
POTASSIUM SERPL-SCNC: 4.2 MMOL/L — SIGNIFICANT CHANGE UP (ref 3.5–5.3)
PROT SERPL-MCNC: 7.9 G/DL — SIGNIFICANT CHANGE UP (ref 6–8.3)
SODIUM SERPL-SCNC: 137 MMOL/L — SIGNIFICANT CHANGE UP (ref 135–145)

## 2024-02-25 PROCEDURE — 99232 SBSQ HOSP IP/OBS MODERATE 35: CPT

## 2024-02-25 RX ADMIN — FAMOTIDINE 20 MILLIGRAM(S): 10 INJECTION INTRAVENOUS at 11:23

## 2024-02-25 RX ADMIN — AMLODIPINE BESYLATE 5 MILLIGRAM(S): 2.5 TABLET ORAL at 05:14

## 2024-02-25 RX ADMIN — TAMSULOSIN HYDROCHLORIDE 0.4 MILLIGRAM(S): 0.4 CAPSULE ORAL at 21:12

## 2024-02-25 RX ADMIN — ENOXAPARIN SODIUM 40 MILLIGRAM(S): 100 INJECTION SUBCUTANEOUS at 05:14

## 2024-02-25 NOTE — CHART NOTE - NSCHARTNOTEFT_GEN_A_CORE
Brief GI Note:    Patient tentatively planned for ERCP Monday 2/26. NPO at midnight. Please ensure 4 AM labs.      Sinan Looney MD  PGY-5 GI/Hepatology Fellow  Available by TEAMS

## 2024-02-25 NOTE — PROGRESS NOTE ADULT - PROBLEM SELECTOR PLAN 1
- CT A/P notable for distal CBD stone  - NPO after MN 2/25  - please order labs (CBC, CMP, INR) for 4 AM on 2/26 so that results will be available early morning; replete lytes and transfuse as needed  - low fat diet as tolerated for now  - monitor LFTs  - GI consulted:  EUS/ERCP tentatively 2/26 pending endoscopy availability

## 2024-02-25 NOTE — PROGRESS NOTE ADULT - SUBJECTIVE AND OBJECTIVE BOX
[facility] Medicine  Pepe Adair MD  Pager (Y-F, 4J-4A): [phone]  Other Times:  [id number]    Patient is a 63y old  Male who presents with a chief complaint of choledocholithiasis (24 Feb 2024 12:20)      SUBJECTIVE / OVERNIGHT EVENTS: no pain no acute events ON   ADDITIONAL REVIEW OF SYSTEMS:    MEDICATIONS  (STANDING):  amLODIPine   Tablet 5 milliGRAM(s) Oral daily  enoxaparin Injectable 40 milliGRAM(s) SubCutaneous every 24 hours  famotidine    Tablet 20 milliGRAM(s) Oral daily  tamsulosin 0.4 milliGRAM(s) Oral at bedtime    MEDICATIONS  (PRN):  acetaminophen     Tablet .. 650 milliGRAM(s) Oral every 6 hours PRN Temp greater or equal to 38C (100.4F), Mild Pain (1 - 3)  aluminum hydroxide/magnesium hydroxide/simethicone Suspension 30 milliLiter(s) Oral every 4 hours PRN Dyspepsia  melatonin 3 milliGRAM(s) Oral at bedtime PRN Insomnia  ondansetron Injectable 4 milliGRAM(s) IV Push every 8 hours PRN Nausea and/or Vomiting  oxycodone    5 mG/acetaminophen 325 mG 1 Tablet(s) Oral every 6 hours PRN Severe Pain (7 - 10)      CAPILLARY BLOOD GLUCOSE        I&O's Summary    24 Feb 2024 07:01  -  25 Feb 2024 07:00  --------------------------------------------------------  IN: 240 mL / OUT: 850 mL / NET: -610 mL        PHYSICAL EXAM:  Vital Signs Last 24 Hrs  T(C): 36.7 (25 Feb 2024 09:52), Max: 36.9 (24 Feb 2024 22:05)  T(F): 98.1 (25 Feb 2024 09:52), Max: 98.4 (24 Feb 2024 22:05)  HR: 66 (25 Feb 2024 09:52) (60 - 79)  BP: 134/90 (25 Feb 2024 09:52) (121/85 - 140/87)  BP(mean): --  RR: 18 (25 Feb 2024 09:52) (18 - 21)  SpO2: 98% (25 Feb 2024 09:52) (98% - 100%)    Parameters below as of 25 Feb 2024 05:10  Patient On (Oxygen Delivery Method): room air    CONSTITUTIONAL: NAD  EYES: conjunctiva and sclera clear  NECK: Supple,  RESPIRATORY: Normal respiratory effort; lungs are clear to auscultation bilaterally  CARDIOVASCULAR: Regular rate and rhythm, normal S1 and S2, no murmur/rub/gallop; No lower extremity edema; Peripheral pulses are 2+ bilaterally  ABDOMEN: Nontender to palpation, normoactive bowel sounds,   PSYCH: awake alert answers questions appropriately  NEUROLOGY: CN 2-12 are intact and symmetric; no gross sensory deficits       LABS:                        13.9   5.20  )-----------( 230      ( 24 Feb 2024 05:45 )             40.6     02-25    137  |  101  |  18  ----------------------------<  101<H>  4.2   |  24  |  1.21    Ca    9.3      25 Feb 2024 06:24  Phos  4.1     02-25  Mg     2.40     02-25    TPro  7.9  /  Alb  4.4  /  TBili  1.2  /  DBili  x   /  AST  154<H>  /  ALT  337<H>  /  AlkPhos  201<H>  02-25          Urinalysis Basic - ( 25 Feb 2024 06:24 )    Color: x / Appearance: x / SG: x / pH: x  Gluc: 101 mg/dL / Ketone: x  / Bili: x / Urobili: x   Blood: x / Protein: x / Nitrite: x   Leuk Esterase: x / RBC: x / WBC x   Sq Epi: x / Non Sq Epi: x / Bacteria: x          RADIOLOGY & ADDITIONAL TESTS:  Results Reviewed:   Imaging Personally Reviewed:  Electrocardiogram Personally Reviewed:    COORDINATION OF CARE:  Care Discussed with Consultants/Other Providers [Y/N]:  Prior or Outpatient Records Reviewed [Y/N]:   [facility]  Pepe Adair MD  Pager (D-F, 5Q-8P): [phone]  Other Times:  [phone]    Patient is a 63y old  Male who presents with a chief complaint of choledocholithiasis (24 Feb 2024 12:20)      SUBJECTIVE / OVERNIGHT EVENTS: no pain no acute events ON; afebrile no abdominal pain; N/V      MEDICATIONS  (STANDING):  amLODIPine   Tablet 5 milliGRAM(s) Oral daily  enoxaparin Injectable 40 milliGRAM(s) SubCutaneous every 24 hours  famotidine    Tablet 20 milliGRAM(s) Oral daily  tamsulosin 0.4 milliGRAM(s) Oral at bedtime    MEDICATIONS  (PRN):  acetaminophen     Tablet .. 650 milliGRAM(s) Oral every 6 hours PRN Temp greater or equal to 38C (100.4F), Mild Pain (1 - 3)  aluminum hydroxide/magnesium hydroxide/simethicone Suspension 30 milliLiter(s) Oral every 4 hours PRN Dyspepsia  melatonin 3 milliGRAM(s) Oral at bedtime PRN Insomnia  ondansetron Injectable 4 milliGRAM(s) IV Push every 8 hours PRN Nausea and/or Vomiting  oxycodone    5 mG/acetaminophen 325 mG 1 Tablet(s) Oral every 6 hours PRN Severe Pain (7 - 10)      CAPILLARY BLOOD GLUCOSE        I&O's Summary    24 Feb 2024 07:01  -  25 Feb 2024 07:00  --------------------------------------------------------  IN: 240 mL / OUT: 850 mL / NET: -610 mL        PHYSICAL EXAM:  Vital Signs Last 24 Hrs  T(C): 36.7 (25 Feb 2024 09:52), Max: 36.9 (24 Feb 2024 22:05)  T(F): 98.1 (25 Feb 2024 09:52), Max: 98.4 (24 Feb 2024 22:05)  HR: 66 (25 Feb 2024 09:52) (60 - 79)  BP: 134/90 (25 Feb 2024 09:52) (121/85 - 140/87)  BP(mean): --  RR: 18 (25 Feb 2024 09:52) (18 - 21)  SpO2: 98% (25 Feb 2024 09:52) (98% - 100%)    Parameters below as of 25 Feb 2024 05:10  Patient On (Oxygen Delivery Method): room air    CONSTITUTIONAL: NAD  EYES: conjunctiva and sclera clear  NECK: Supple,  RESPIRATORY: Normal respiratory effort; lungs are clear to auscultation bilaterally  CARDIOVASCULAR: Regular rate and rhythm, normal S1 and S2, no murmur/rub/gallop; No lower extremity edema; Peripheral pulses are 2+ bilaterally  ABDOMEN: Nontender to palpation, normoactive bowel sounds,   PSYCH: awake alert answers questions appropriately  NEUROLOGY: CN 2-12 are intact and symmetric; no gross sensory deficits       LABS:                        13.9   5.20  )-----------( 230      ( 24 Feb 2024 05:45 )             40.6     02-25    137  |  101  |  18  ----------------------------<  101<H>  4.2   |  24  |  1.21    Ca    9.3      25 Feb 2024 06:24  Phos  4.1     02-25  Mg     2.40     02-25    TPro  7.9  /  Alb  4.4  /  TBili  1.2  /  DBili  x   /  AST  154<H>  /  ALT  337<H>  /  AlkPhos  201<H>  02-25          Urinalysis Basic - ( 25 Feb 2024 06:24 )    Color: x / Appearance: x / SG: x / pH: x  Gluc: 101 mg/dL / Ketone: x  / Bili: x / Urobili: x   Blood: x / Protein: x / Nitrite: x   Leuk Esterase: x / RBC: x / WBC x   Sq Epi: x / Non Sq Epi: x / Bacteria: x          RADIOLOGY & ADDITIONAL TESTS:  Results Reviewed:   Imaging Personally Reviewed:  Electrocardiogram Personally Reviewed:    COORDINATION OF CARE:  Care Discussed with Consultants/Other Providers [Y/N]:  Prior or Outpatient Records Reviewed [Y/N]:

## 2024-02-26 LAB
ALBUMIN SERPL ELPH-MCNC: 4.3 G/DL — SIGNIFICANT CHANGE UP (ref 3.3–5)
ALP SERPL-CCNC: 212 U/L — HIGH (ref 40–120)
ALT FLD-CCNC: 304 U/L — HIGH (ref 4–41)
ANION GAP SERPL CALC-SCNC: 12 MMOL/L — SIGNIFICANT CHANGE UP (ref 7–14)
APTT BLD: 39.6 SEC — HIGH (ref 24.5–35.6)
AST SERPL-CCNC: 114 U/L — HIGH (ref 4–40)
BASOPHILS # BLD AUTO: 0.03 K/UL — SIGNIFICANT CHANGE UP (ref 0–0.2)
BASOPHILS NFR BLD AUTO: 0.6 % — SIGNIFICANT CHANGE UP (ref 0–2)
BILIRUB SERPL-MCNC: 0.8 MG/DL — SIGNIFICANT CHANGE UP (ref 0.2–1.2)
BUN SERPL-MCNC: 20 MG/DL — SIGNIFICANT CHANGE UP (ref 7–23)
CALCIUM SERPL-MCNC: 9.3 MG/DL — SIGNIFICANT CHANGE UP (ref 8.4–10.5)
CHLORIDE SERPL-SCNC: 103 MMOL/L — SIGNIFICANT CHANGE UP (ref 98–107)
CO2 SERPL-SCNC: 25 MMOL/L — SIGNIFICANT CHANGE UP (ref 22–31)
CREAT SERPL-MCNC: 1.21 MG/DL — SIGNIFICANT CHANGE UP (ref 0.5–1.3)
EGFR: 67 ML/MIN/1.73M2 — SIGNIFICANT CHANGE UP
EOSINOPHIL # BLD AUTO: 0.41 K/UL — SIGNIFICANT CHANGE UP (ref 0–0.5)
EOSINOPHIL NFR BLD AUTO: 8 % — HIGH (ref 0–6)
GLUCOSE BLDC GLUCOMTR-MCNC: 104 MG/DL — HIGH (ref 70–99)
GLUCOSE SERPL-MCNC: 100 MG/DL — HIGH (ref 70–99)
HCT VFR BLD CALC: 42.7 % — SIGNIFICANT CHANGE UP (ref 39–50)
HGB BLD-MCNC: 14.7 G/DL — SIGNIFICANT CHANGE UP (ref 13–17)
IANC: 2.82 K/UL — SIGNIFICANT CHANGE UP (ref 1.8–7.4)
IMM GRANULOCYTES NFR BLD AUTO: 0.8 % — SIGNIFICANT CHANGE UP (ref 0–0.9)
INR BLD: 0.95 RATIO — SIGNIFICANT CHANGE UP (ref 0.85–1.18)
LYMPHOCYTES # BLD AUTO: 1.27 K/UL — SIGNIFICANT CHANGE UP (ref 1–3.3)
LYMPHOCYTES # BLD AUTO: 24.8 % — SIGNIFICANT CHANGE UP (ref 13–44)
MCHC RBC-ENTMCNC: 30.4 PG — SIGNIFICANT CHANGE UP (ref 27–34)
MCHC RBC-ENTMCNC: 34.4 GM/DL — SIGNIFICANT CHANGE UP (ref 32–36)
MCV RBC AUTO: 88.4 FL — SIGNIFICANT CHANGE UP (ref 80–100)
MONOCYTES # BLD AUTO: 0.55 K/UL — SIGNIFICANT CHANGE UP (ref 0–0.9)
MONOCYTES NFR BLD AUTO: 10.7 % — SIGNIFICANT CHANGE UP (ref 2–14)
NEUTROPHILS # BLD AUTO: 2.82 K/UL — SIGNIFICANT CHANGE UP (ref 1.8–7.4)
NEUTROPHILS NFR BLD AUTO: 55.1 % — SIGNIFICANT CHANGE UP (ref 43–77)
NRBC # BLD: 0 /100 WBCS — SIGNIFICANT CHANGE UP (ref 0–0)
NRBC # FLD: 0 K/UL — SIGNIFICANT CHANGE UP (ref 0–0)
PLATELET # BLD AUTO: 236 K/UL — SIGNIFICANT CHANGE UP (ref 150–400)
POTASSIUM SERPL-MCNC: 4.2 MMOL/L — SIGNIFICANT CHANGE UP (ref 3.5–5.3)
POTASSIUM SERPL-SCNC: 4.2 MMOL/L — SIGNIFICANT CHANGE UP (ref 3.5–5.3)
PROT SERPL-MCNC: 7.9 G/DL — SIGNIFICANT CHANGE UP (ref 6–8.3)
PROTHROM AB SERPL-ACNC: 10.7 SEC — SIGNIFICANT CHANGE UP (ref 9.5–13)
RBC # BLD: 4.83 M/UL — SIGNIFICANT CHANGE UP (ref 4.2–5.8)
RBC # FLD: 12.6 % — SIGNIFICANT CHANGE UP (ref 10.3–14.5)
SODIUM SERPL-SCNC: 140 MMOL/L — SIGNIFICANT CHANGE UP (ref 135–145)
WBC # BLD: 5.12 K/UL — SIGNIFICANT CHANGE UP (ref 3.8–10.5)
WBC # FLD AUTO: 5.12 K/UL — SIGNIFICANT CHANGE UP (ref 3.8–10.5)

## 2024-02-26 PROCEDURE — 74330 X-RAY BILE/PANC ENDOSCOPY: CPT | Mod: 26,GC

## 2024-02-26 PROCEDURE — 43259 EGD US EXAM DUODENUM/JEJUNUM: CPT | Mod: GC

## 2024-02-26 PROCEDURE — 99233 SBSQ HOSP IP/OBS HIGH 50: CPT

## 2024-02-26 PROCEDURE — 43274 ERCP DUCT STENT PLACEMENT: CPT | Mod: GC

## 2024-02-26 PROCEDURE — 43264 ERCP REMOVE DUCT CALCULI: CPT | Mod: GC

## 2024-02-26 DEVICE — STENT PANC ZIMMON 5FRX6CM: Type: IMPLANTABLE DEVICE | Status: FUNCTIONAL

## 2024-02-26 DEVICE — GWIRE JAG REVOLUTION 260CM: Type: IMPLANTABLE DEVICE | Status: FUNCTIONAL

## 2024-02-26 DEVICE — STENT PANCREAS CATH PUSH 5FRX170CM: Type: IMPLANTABLE DEVICE | Status: FUNCTIONAL

## 2024-02-26 DEVICE — GWIRE JAGTOME REVOLUTION RX 260CM/0.025IN: Type: IMPLANTABLE DEVICE | Status: FUNCTIONAL

## 2024-02-26 DEVICE — CATH BLLN EXTRACT DIST GUIDE WIRE 15MM 3LUM: Type: IMPLANTABLE DEVICE | Status: FUNCTIONAL

## 2024-02-26 RX ORDER — SODIUM CHLORIDE 9 MG/ML
1000 INJECTION INTRAMUSCULAR; INTRAVENOUS; SUBCUTANEOUS
Refills: 0 | Status: DISCONTINUED | OUTPATIENT
Start: 2024-02-26 | End: 2024-02-27

## 2024-02-26 RX ORDER — SODIUM CHLORIDE 9 MG/ML
2000 INJECTION, SOLUTION INTRAVENOUS ONCE
Refills: 0 | Status: DISCONTINUED | OUTPATIENT
Start: 2024-02-26 | End: 2024-02-27

## 2024-02-26 RX ADMIN — SODIUM CHLORIDE 75 MILLILITER(S): 9 INJECTION INTRAMUSCULAR; INTRAVENOUS; SUBCUTANEOUS at 10:29

## 2024-02-26 RX ADMIN — TAMSULOSIN HYDROCHLORIDE 0.4 MILLIGRAM(S): 0.4 CAPSULE ORAL at 21:14

## 2024-02-26 RX ADMIN — AMLODIPINE BESYLATE 5 MILLIGRAM(S): 2.5 TABLET ORAL at 05:19

## 2024-02-26 NOTE — PRE PROCEDURE NOTE - PRE PROCEDURE EVALUATION
Attending Physician:   Dr. Nielson    Procedure: EUS/ERCP    Indication for Procedure: Choledocholithiasis    ________________________________________________________  PAST MEDICAL & SURGICAL HISTORY:  Benign essential HTN      HLD (hyperlipidemia)      BPH (benign prostatic hyperplasia)      No significant past surgical history        ALLERGIES:  No Known Allergies    HOME MEDICATIONS:  AMLODIPINE BESYLATE 5 MG TAB: 1 tab(s) orally once a day  ATORVASTATIN 20 MG TABLET: 1 tab(s) orally once a day  FAMOTIDINE 20 MG TABLET: 1 tab(s) orally once a day  TAMSULOSIN HCL 0.4 MG CAPSULE: 1 tab(s) orally once a day    AICD/PPM: [ ] yes   [x ] no    PERTINENT LAB DATA:                        14.7   5.12  )-----------( 236      ( 26 Feb 2024 03:17 )             42.7     02-26    140  |  103  |  20  ----------------------------<  100<H>  4.2   |  25  |  1.21    Ca    9.3      26 Feb 2024 03:17  Phos  4.1     02-25  Mg     2.40     02-25    TPro  7.9  /  Alb  4.3  /  TBili  0.8  /  DBili  x   /  AST  114<H>  /  ALT  304<H>  /  AlkPhos  212<H>  02-26    PT/INR - ( 26 Feb 2024 03:17 )   PT: 10.7 sec;   INR: 0.95 ratio         PTT - ( 26 Feb 2024 03:17 )  PTT:39.6 sec            PHYSICAL EXAMINATION:    Height (cm): 180  Weight (kg): 72.6  BMI (kg/m2): 22.4  BSA (m2): 1.92T(C): 36.7  HR: 72  BP: 144/95  RR: 18  SpO2: 96%    Constitutional: NAD  HEENT: PERRLA, EOMI,    Neck:  No JVD  Respiratory: CTAB/L  Cardiovascular: S1 and S2  Gastrointestinal: BS+, soft, NT/ND  Extremities: No peripheral edema  Neurological: A/O x 3, no focal deficits  Psychiatric: Normal mood, normal affect  Skin: No rashes    ASA Class: I [ ]  II [ ]  III [ x]  IV [ ]    COMMENTS:    The patient is a suitable candidate for the planned procedure unless box checked [ ]  No, explain:    I explained the risks (bleeding, infection, perforation, pancreatitis, CV risk, anesthesia risk)/b/a with the patient. The patient agrees to proceed. Patient had opportunity to ask questions in preprocedure bay 3. All questions answered.  
Attending Physician:            KYLAH                Procedure: ERCP    Indication for Procedure: CBD stone  ________________________________________________________  PAST MEDICAL & SURGICAL HISTORY:  Benign essential HTN      HLD (hyperlipidemia)      BPH (benign prostatic hyperplasia)      No significant past surgical history        ALLERGIES:  No Known Allergies    HOME MEDICATIONS:  AMLODIPINE BESYLATE 5 MG TAB: 1 tab(s) orally once a day  ATORVASTATIN 20 MG TABLET: 1 tab(s) orally once a day  FAMOTIDINE 20 MG TABLET: 1 tab(s) orally once a day  TAMSULOSIN HCL 0.4 MG CAPSULE: 1 tab(s) orally once a day    AICD/PPM: [ ] yes   [ ] no    PERTINENT LAB DATA:                        14.7   5.12  )-----------( 236      ( 26 Feb 2024 03:17 )             42.7     02-26    140  |  103  |  20  ----------------------------<  100<H>  4.2   |  25  |  1.21    Ca    9.3      26 Feb 2024 03:17  Phos  4.1     02-25  Mg     2.40     02-25    TPro  7.9  /  Alb  4.3  /  TBili  0.8  /  DBili  x   /  AST  114<H>  /  ALT  304<H>  /  AlkPhos  212<H>  02-26    PT/INR - ( 26 Feb 2024 03:17 )   PT: 10.7 sec;   INR: 0.95 ratio         PTT - ( 26 Feb 2024 03:17 )  PTT:39.6 sec            PHYSICAL EXAMINATION:    Height (cm): 180  Weight (kg): 72.6  BMI (kg/m2): 22.4  BSA (m2): 1.92T(C): 36.7  HR: 72  BP: 144/95  RR: 18  SpO2: 96%    Constitutional: NAD  HEENT: PERRLA, EOMI,    Neck:  No JVD  Respiratory: CTAB/L  Cardiovascular: S1 and S2  Gastrointestinal: BS+, soft, NT/ND  Extremities: No peripheral edema  Neurological: A/O x 3, no focal deficits  Psychiatric: Normal mood, normal affect  Skin: No rashes    ASA Class: I [ ]  II [ ]  III [x ]  IV [ ]    COMMENTS:    The patient is a suitable candidate for the planned procedure unless box checked [ ]  No, explain:    I explained the risks (bleeding, infection perforation, pancreatitis, Cv risk, anesthesia risk)/b/a with the patient. The patient agrees to proceed.

## 2024-02-26 NOTE — PROGRESS NOTE ADULT - ASSESSMENT
62 yo Male w/ Hx HTN, BPH p/w abdominal pain found to have choledocholithiases  62 yo Cantonese speaking Male w/ Hx HTN, BPH p/w abdominal pain found to have choledocholithiases

## 2024-02-26 NOTE — PROGRESS NOTE ADULT - SUBJECTIVE AND OBJECTIVE BOX
Patient is a 63y old  Male who presents with a chief complaint of choledocholithiasis (25 Feb 2024 11:10)      SUBJECTIVE / OVERNIGHT EVENTS:    MEDICATIONS  (STANDING):  amLODIPine   Tablet 5 milliGRAM(s) Oral daily  enoxaparin Injectable 40 milliGRAM(s) SubCutaneous every 24 hours  famotidine    Tablet 20 milliGRAM(s) Oral daily  tamsulosin 0.4 milliGRAM(s) Oral at bedtime    MEDICATIONS  (PRN):  acetaminophen     Tablet .. 650 milliGRAM(s) Oral every 6 hours PRN Temp greater or equal to 38C (100.4F), Mild Pain (1 - 3)  aluminum hydroxide/magnesium hydroxide/simethicone Suspension 30 milliLiter(s) Oral every 4 hours PRN Dyspepsia  melatonin 3 milliGRAM(s) Oral at bedtime PRN Insomnia  ondansetron Injectable 4 milliGRAM(s) IV Push every 8 hours PRN Nausea and/or Vomiting  oxycodone    5 mG/acetaminophen 325 mG 1 Tablet(s) Oral every 6 hours PRN Severe Pain (7 - 10)      Vital Signs Last 24 Hrs  T(C): 37 (26 Feb 2024 09:03), Max: 37 (26 Feb 2024 09:03)  T(F): 98.6 (26 Feb 2024 09:03), Max: 98.6 (26 Feb 2024 09:03)  HR: 58 (26 Feb 2024 09:03) (56 - 82)  BP: 123/77 (26 Feb 2024 09:03) (118/82 - 141/92)  BP(mean): --  RR: 17 (26 Feb 2024 09:03) (16 - 18)  SpO2: 100% (26 Feb 2024 09:03) (98% - 100%)    Parameters below as of 26 Feb 2024 09:03  Patient On (Oxygen Delivery Method): room air      CAPILLARY BLOOD GLUCOSE        I&O's Summary    25 Feb 2024 07:01  -  26 Feb 2024 07:00  --------------------------------------------------------  IN: 240 mL / OUT: 0 mL / NET: 240 mL        PHYSICAL EXAM:  GENERAL: NAD, well-developed  HEAD:  Atraumatic, Normocephalic  EYES: EOMI, PERRLA, conjunctiva and sclera clear  NECK: Supple, No JVD  CHEST/LUNG: Clear to auscultation bilaterally; No wheeze  HEART: Regular rate and rhythm; No murmurs, rubs, or gallops  ABDOMEN: Soft, Nontender, Nondistended; Bowel sounds present  EXTREMITIES:  2+ Peripheral Pulses, No clubbing, cyanosis, or edema  PSYCH: AAOx3  NEUROLOGY: non-focal  SKIN: No rashes or lesions    LABS:                        14.7   5.12  )-----------( 236      ( 26 Feb 2024 03:17 )             42.7     02-26    140  |  103  |  20  ----------------------------<  100<H>  4.2   |  25  |  1.21    Ca    9.3      26 Feb 2024 03:17  Phos  4.1     02-25  Mg     2.40     02-25    TPro  7.9  /  Alb  4.3  /  TBili  0.8  /  DBili  x   /  AST  114<H>  /  ALT  304<H>  /  AlkPhos  212<H>  02-26    PT/INR - ( 26 Feb 2024 03:17 )   PT: 10.7 sec;   INR: 0.95 ratio         PTT - ( 26 Feb 2024 03:17 )  PTT:39.6 sec      Urinalysis Basic - ( 26 Feb 2024 03:17 )    Color: x / Appearance: x / SG: x / pH: x  Gluc: 100 mg/dL / Ketone: x  / Bili: x / Urobili: x   Blood: x / Protein: x / Nitrite: x   Leuk Esterase: x / RBC: x / WBC x   Sq Epi: x / Non Sq Epi: x / Bacteria: x        RADIOLOGY & ADDITIONAL TESTS:    Imaging Personally Reviewed:    Consultant(s) Notes Reviewed:      Care Discussed with Consultants/Other Providers:   Patient is a 63y old  Male who presents with a chief complaint of choledocholithiasis (25 Feb 2024 11:10)      SUBJECTIVE / OVERNIGHT EVENTS:  Patient has no new complaints. Denies cp, SOB, abdominal pain, N/V/D. Used google translate for Cantonese     MEDICATIONS  (STANDING):  amLODIPine   Tablet 5 milliGRAM(s) Oral daily  enoxaparin Injectable 40 milliGRAM(s) SubCutaneous every 24 hours  famotidine    Tablet 20 milliGRAM(s) Oral daily  tamsulosin 0.4 milliGRAM(s) Oral at bedtime    MEDICATIONS  (PRN):  acetaminophen     Tablet .. 650 milliGRAM(s) Oral every 6 hours PRN Temp greater or equal to 38C (100.4F), Mild Pain (1 - 3)  aluminum hydroxide/magnesium hydroxide/simethicone Suspension 30 milliLiter(s) Oral every 4 hours PRN Dyspepsia  melatonin 3 milliGRAM(s) Oral at bedtime PRN Insomnia  ondansetron Injectable 4 milliGRAM(s) IV Push every 8 hours PRN Nausea and/or Vomiting  oxycodone    5 mG/acetaminophen 325 mG 1 Tablet(s) Oral every 6 hours PRN Severe Pain (7 - 10)      Vital Signs Last 24 Hrs  T(C): 37 (26 Feb 2024 09:03), Max: 37 (26 Feb 2024 09:03)  T(F): 98.6 (26 Feb 2024 09:03), Max: 98.6 (26 Feb 2024 09:03)  HR: 58 (26 Feb 2024 09:03) (56 - 82)  BP: 123/77 (26 Feb 2024 09:03) (118/82 - 141/92)  BP(mean): --  RR: 17 (26 Feb 2024 09:03) (16 - 18)  SpO2: 100% (26 Feb 2024 09:03) (98% - 100%)    Parameters below as of 26 Feb 2024 09:03  Patient On (Oxygen Delivery Method): room air      CAPILLARY BLOOD GLUCOSE        I&O's Summary    25 Feb 2024 07:01  -  26 Feb 2024 07:00  --------------------------------------------------------  IN: 240 mL / OUT: 0 mL / NET: 240 mL        PHYSICAL EXAM:  GENERAL: NAD, well-developed  HEAD:  Atraumatic, Normocephalic  EYES: EOMI, PERRLA, conjunctiva and sclera clear  NECK: Supple, No JVD  CHEST/LUNG: Clear to auscultation bilaterally; No wheeze  HEART: Regular rate and rhythm; No murmurs, rubs, or gallops  ABDOMEN: Soft, Nontender, Nondistended; Bowel sounds present  EXTREMITIES:  2+ Peripheral Pulses, No clubbing, cyanosis, or edema  PSYCH: AAOx3  NEUROLOGY: non-focal  SKIN: No rashes or lesions    LABS:                        14.7   5.12  )-----------( 236      ( 26 Feb 2024 03:17 )             42.7     02-26    140  |  103  |  20  ----------------------------<  100<H>  4.2   |  25  |  1.21    Ca    9.3      26 Feb 2024 03:17  Phos  4.1     02-25  Mg     2.40     02-25    TPro  7.9  /  Alb  4.3  /  TBili  0.8  /  DBili  x   /  AST  114<H>  /  ALT  304<H>  /  AlkPhos  212<H>  02-26    PT/INR - ( 26 Feb 2024 03:17 )   PT: 10.7 sec;   INR: 0.95 ratio         PTT - ( 26 Feb 2024 03:17 )  PTT:39.6 sec      Urinalysis Basic - ( 26 Feb 2024 03:17 )    Color: x / Appearance: x / SG: x / pH: x  Gluc: 100 mg/dL / Ketone: x  / Bili: x / Urobili: x   Blood: x / Protein: x / Nitrite: x   Leuk Esterase: x / RBC: x / WBC x   Sq Epi: x / Non Sq Epi: x / Bacteria: x        RADIOLOGY & ADDITIONAL TESTS:    Imaging Personally Reviewed:    Consultant(s) Notes Reviewed:      Care Discussed with Consultants/Other Providers:

## 2024-02-26 NOTE — PROGRESS NOTE ADULT - PROBLEM SELECTOR PLAN 1
- CT A/P notable for distal CBD stone ( 5mm)  - NPO   -LFTs still elevated  - GI consulted:  EUS/ERCP tentatively 2/26 pending endoscopy availability - CT A/P notable for distal CBD stone ( 5mm)  - NPO ; Start NS @ 75ml/hr  -no abdominal pain, fever, or jaundice.   -LFTs still elevated  - GI consulted:  EUS/ERCP tentatively 2/26 pending endoscopy availability

## 2024-02-27 ENCOUNTER — TRANSCRIPTION ENCOUNTER (OUTPATIENT)
Age: 64
End: 2024-02-27

## 2024-02-27 LAB
ADD ON TEST-SPECIMEN IN LAB: SIGNIFICANT CHANGE UP
ALBUMIN SERPL ELPH-MCNC: 4 G/DL — SIGNIFICANT CHANGE UP (ref 3.3–5)
ALP SERPL-CCNC: 177 U/L — HIGH (ref 40–120)
ALT FLD-CCNC: 280 U/L — HIGH (ref 4–41)
ANION GAP SERPL CALC-SCNC: 13 MMOL/L — SIGNIFICANT CHANGE UP (ref 7–14)
AST SERPL-CCNC: 124 U/L — HIGH (ref 4–40)
BILIRUB DIRECT SERPL-MCNC: 0.3 MG/DL — SIGNIFICANT CHANGE UP (ref 0–0.3)
BILIRUB INDIRECT FLD-MCNC: 0.5 MG/DL — SIGNIFICANT CHANGE UP (ref 0–1)
BILIRUB SERPL-MCNC: 0.8 MG/DL — SIGNIFICANT CHANGE UP (ref 0.2–1.2)
BUN SERPL-MCNC: 19 MG/DL — SIGNIFICANT CHANGE UP (ref 7–23)
CALCIUM SERPL-MCNC: 9 MG/DL — SIGNIFICANT CHANGE UP (ref 8.4–10.5)
CHLORIDE SERPL-SCNC: 101 MMOL/L — SIGNIFICANT CHANGE UP (ref 98–107)
CO2 SERPL-SCNC: 23 MMOL/L — SIGNIFICANT CHANGE UP (ref 22–31)
CREAT ?TM UR-MCNC: 69 MG/DL — SIGNIFICANT CHANGE UP
CREAT SERPL-MCNC: 1.02 MG/DL — SIGNIFICANT CHANGE UP (ref 0.5–1.3)
EGFR: 83 ML/MIN/1.73M2 — SIGNIFICANT CHANGE UP
GLUCOSE SERPL-MCNC: 100 MG/DL — HIGH (ref 70–99)
HCT VFR BLD CALC: 39.6 % — SIGNIFICANT CHANGE UP (ref 39–50)
HGB BLD-MCNC: 13.5 G/DL — SIGNIFICANT CHANGE UP (ref 13–17)
MAGNESIUM SERPL-MCNC: 2.1 MG/DL — SIGNIFICANT CHANGE UP (ref 1.6–2.6)
MCHC RBC-ENTMCNC: 30 PG — SIGNIFICANT CHANGE UP (ref 27–34)
MCHC RBC-ENTMCNC: 34.1 GM/DL — SIGNIFICANT CHANGE UP (ref 32–36)
MCV RBC AUTO: 88 FL — SIGNIFICANT CHANGE UP (ref 80–100)
NRBC # BLD: 0 /100 WBCS — SIGNIFICANT CHANGE UP (ref 0–0)
NRBC # FLD: 0 K/UL — SIGNIFICANT CHANGE UP (ref 0–0)
PHOSPHATE SERPL-MCNC: 4 MG/DL — SIGNIFICANT CHANGE UP (ref 2.5–4.5)
PLATELET # BLD AUTO: 232 K/UL — SIGNIFICANT CHANGE UP (ref 150–400)
POTASSIUM SERPL-MCNC: 4 MMOL/L — SIGNIFICANT CHANGE UP (ref 3.5–5.3)
POTASSIUM SERPL-SCNC: 4 MMOL/L — SIGNIFICANT CHANGE UP (ref 3.5–5.3)
PROT ?TM UR-MCNC: 7 MG/DL — SIGNIFICANT CHANGE UP
PROT SERPL-MCNC: 7 G/DL — SIGNIFICANT CHANGE UP (ref 6–8.3)
PROT/CREAT UR-RTO: 0.1 RATIO — SIGNIFICANT CHANGE UP (ref 0–0.2)
RBC # BLD: 4.5 M/UL — SIGNIFICANT CHANGE UP (ref 4.2–5.8)
RBC # FLD: 12.7 % — SIGNIFICANT CHANGE UP (ref 10.3–14.5)
SODIUM SERPL-SCNC: 137 MMOL/L — SIGNIFICANT CHANGE UP (ref 135–145)
WBC # BLD: 7.51 K/UL — SIGNIFICANT CHANGE UP (ref 3.8–10.5)
WBC # FLD AUTO: 7.51 K/UL — SIGNIFICANT CHANGE UP (ref 3.8–10.5)

## 2024-02-27 PROCEDURE — 99233 SBSQ HOSP IP/OBS HIGH 50: CPT

## 2024-02-27 PROCEDURE — 99232 SBSQ HOSP IP/OBS MODERATE 35: CPT | Mod: GC

## 2024-02-27 PROCEDURE — 99222 1ST HOSP IP/OBS MODERATE 55: CPT

## 2024-02-27 RX ADMIN — ENOXAPARIN SODIUM 40 MILLIGRAM(S): 100 INJECTION SUBCUTANEOUS at 05:17

## 2024-02-27 RX ADMIN — AMLODIPINE BESYLATE 5 MILLIGRAM(S): 2.5 TABLET ORAL at 05:17

## 2024-02-27 RX ADMIN — FAMOTIDINE 20 MILLIGRAM(S): 10 INJECTION INTRAVENOUS at 12:03

## 2024-02-27 RX ADMIN — TAMSULOSIN HYDROCHLORIDE 0.4 MILLIGRAM(S): 0.4 CAPSULE ORAL at 21:20

## 2024-02-27 NOTE — CONSULT NOTE ADULT - ASSESSMENT
63M with a pmh of HTN, HLD BPH transferred 2/23 from [place] for GI evaluation now s/p ERCP, sphincterotomy, balloon extraction of stone, and pancreatic stent placement yesterday 2/26 and surgery consult requested today for cholecystectomy. Patient stable on the floor.    Plan/Rec  - Addon for lap cholecystectomy tomorrow  - NPO at midnight  - team to consent patient with Cantonese     Patient discussed with Surgery Chief resident on behalf of Surgeon Dr. Norwood.    Surgery Team B  [id number]          63M with a pmh of HTN, HLD BPH transferred 2/23 from [place] for GI evaluation now s/p ERCP, sphincterotomy, balloon extraction of stone, and pancreatic stent placement yesterday 2/26 and surgery consult requested today for cholecystectomy. Patient stable on the floor.    Plan/Rec  - Addon for lap cholecystectomy tomorrow  - Preop orders          Please order pre-op labs (BMP, CBC, Coags, Type and Screen) as STAT at 4AM.          Please make NPO at midnight with maintenance IVF.   - Team to consent patient with Cantonese     Patient discussed with Surgery Chief resident on behalf of Surgeon Dr. Norwood.    Surgery Team B  [id number]

## 2024-02-27 NOTE — PROGRESS NOTE ADULT - ASSESSMENT
62 yo Cantonese speaking Male w/ Hx HTN, BPH p/w abdominal pain found to have choledocholithiases. Patient now  s/p ERCP on 2/26 and sphincterotomy, balloon extraction of stone, and pancreatic stent placement.

## 2024-02-27 NOTE — DISCHARGE NOTE PROVIDER - HOSPITAL COURSE
62 yo Cantonese speaking Male w/ Hx HTN, BPH who was transferred on 2/23 from [place] for GI evaluation of abdominal pain and treatment of choledocholithiasis. On arrival, patient afebrile, vital signs normal, AST/ALT elevated (319/196), WBC w/in normal limits (10.4). CT AP from [place] demonstrated 5 mm distal CBD stone with minimal biliary dilatation.    2/24-25: Patient stable on the floor: afebrile, vital signs normal, liver labs elevated.     2/26: ERCP w/ sphincterotomy, balloon extraction of stone, and pancreatic stent placement. Patient recovered well in PACU.    2/27-28: No acute events. Patient's AST/ALT/alk phos continued to downtrend s/p ERCP. Surgery consulted for cholecystectomy.     2/29: Patient underwent laparoscopic cholecystectomy. In PACU patient's pain was controlled, he was eating, drinking and ambulating without issue and discharged from PACU.

## 2024-02-27 NOTE — DISCHARGE NOTE PROVIDER - NSDCCPCAREPLAN_GEN_ALL_CORE_FT
PRINCIPAL DISCHARGE DIAGNOSIS  Diagnosis: Choledocholithiasis  Assessment and Plan of Treatment: You will need an outpatient abdominal x-ray to check for stent placement within 2 weeks. If the stent is still in place you will need an endoscopy for stent removal within 4 weeks.  Please be sure to follow up with gastroenterology to coordinate this.

## 2024-02-27 NOTE — DISCHARGE NOTE PROVIDER - NSDCFUADDINST_GEN_ALL_CORE_FT
PAIN: You may continue to take Acetaminophen (Tylenol) and Ibuprofen (Advil, Motrin) over the counter for pain. You can alternate the two medications, taking one every 3 hours. We recommend taking the medications around the clock for the first few days at home after surgery. Then you can start taking them only as needed for pain.    WOUND CARE:  You should allow warm soapy water to run down the incisions in the shower. You should not need to scrub the area. If you have glue or steri-strips on your wound, it will fall off on its own. Staples will be removed at follow-up.    BATHING: Please do not soak or submerge the wound in water (bath, swimming) for 10 days after your surgery.    ACTIVITY: No heavy lifting, straining, or vigorous activity until your follow-up appointment in 2 weeks.     NOTIFY US IF: You have any bleeding that does not stop, any pus draining from your wounds, any fever (over 100.5 F) or chills, persistent nausea/vomiting, persistent diarrhea, or if your pain is not controlled on your discharge pain medications.

## 2024-02-27 NOTE — PROGRESS NOTE ADULT - SUBJECTIVE AND OBJECTIVE BOX
Chief Complaint:  Patient is a 63y old  Male who presents with a chief complaint of choledocholithiasis (26 Feb 2024 10:08)      Interval Events: Reports feeling well. Denies any N/V/D/C, abd pain, melena or hematochezia.  - s/p ERCP (2/26)- Choledocholithiasis s/p sphincterotomy, balloon extraction of stone, and pancreatic stent placement. Endosonographic finding of choledocholithiasis and pancreatic parenchymal changes with lobularity and hyperechoic strands. Benign appearing lilia hepatis lymphadenopathy.    Hospital Medications:  acetaminophen     Tablet .. 650 milliGRAM(s) Oral every 6 hours PRN  aluminum hydroxide/magnesium hydroxide/simethicone Suspension 30 milliLiter(s) Oral every 4 hours PRN  amLODIPine   Tablet 5 milliGRAM(s) Oral daily  enoxaparin Injectable 40 milliGRAM(s) SubCutaneous every 24 hours  famotidine    Tablet 20 milliGRAM(s) Oral daily  lactated ringers Bolus 2000 milliLiter(s) IV Bolus once  melatonin 3 milliGRAM(s) Oral at bedtime PRN  ondansetron Injectable 4 milliGRAM(s) IV Push every 8 hours PRN  oxycodone    5 mG/acetaminophen 325 mG 1 Tablet(s) Oral every 6 hours PRN  sodium chloride 0.9%. 1000 milliLiter(s) IV Continuous <Continuous>  tamsulosin 0.4 milliGRAM(s) Oral at bedtime      PMHX/PSHX:  Benign essential HTN    HLD (hyperlipidemia)    BPH (benign prostatic hyperplasia)    No significant past surgical history            ROS: 14 point ROS negative unless otherwise stated in subjective      PHYSICAL EXAM:     GENERAL:  Well developed, no distress  HEENT:  NC/AT,  conjunctivae clear, sclera anicteric  CHEST:  Full & symmetric excursion, no increased effort w/ respirations  HEART:  Regular rhythm & rate  ABDOMEN:  Soft, non-tender, non-distended  EXTREMITIES:  no LE  edema  SKIN:  No rash/erythema/ecchymoses/petechiae/wounds/jaundice  NEURO:  Alert, oriented    Vital Signs:  Vital Signs Last 24 Hrs  T(C): 36.5 (27 Feb 2024 05:15), Max: 37 (26 Feb 2024 09:03)  T(F): 97.7 (27 Feb 2024 05:15), Max: 98.6 (26 Feb 2024 09:03)  HR: 58 (27 Feb 2024 05:15) (58 - 72)  BP: 122/84 (27 Feb 2024 05:15) (122/84 - 144/95)  BP(mean): 102 (26 Feb 2024 14:50) (92 - 102)  RR: 17 (27 Feb 2024 05:15) (12 - 18)  SpO2: 99% (27 Feb 2024 05:15) (96% - 100%)    Parameters below as of 27 Feb 2024 05:15  Patient On (Oxygen Delivery Method): room air      Daily Height in cm: 180 (26 Feb 2024 11:10)    Daily     LABS:                        13.5   7.51  )-----------( 232      ( 27 Feb 2024 05:46 )             39.6     02-26    140  |  103  |  20  ----------------------------<  100<H>  4.2   |  25  |  1.21    Ca    9.3      26 Feb 2024 03:17    TPro  7.9  /  Alb  4.3  /  TBili  0.8  /  DBili  x   /  AST  114<H>  /  ALT  304<H>  /  AlkPhos  212<H>  02-26    LIVER FUNCTIONS - ( 26 Feb 2024 03:17 )  Alb: 4.3 g/dL / Pro: 7.9 g/dL / ALK PHOS: 212 U/L / ALT: 304 U/L / AST: 114 U/L / GGT: x           PT/INR - ( 26 Feb 2024 03:17 )   PT: 10.7 sec;   INR: 0.95 ratio         PTT - ( 26 Feb 2024 03:17 )  PTT:39.6 sec  Urinalysis Basic - ( 26 Feb 2024 03:17 )    Color: x / Appearance: x / SG: x / pH: x  Gluc: 100 mg/dL / Ketone: x  / Bili: x / Urobili: x   Blood: x / Protein: x / Nitrite: x   Leuk Esterase: x / RBC: x / WBC x   Sq Epi: x / Non Sq Epi: x / Bacteria: x          Imaging: No new abdominal imaging              Chief Complaint:  Patient is a 63y old  Male who presents with a chief complaint of choledocholithiasis (26 Feb 2024 10:08)      Interval Events: Reports feeling well. Denies any N/V/D/C, abd pain, melena or hematochezia.  - s/p ERCP (2/26)- Choledocholithiasis s/p sphincterotomy, balloon extraction of stone, and pancreatic stent placement. Endosonographic finding of choledocholithiasis and pancreatic parenchymal changes with lobularity and hyperechoic strands. Benign appearing lilia hepatis lymphadenopathy.    Hospital Medications:  acetaminophen     Tablet .. 650 milliGRAM(s) Oral every 6 hours PRN  aluminum hydroxide/magnesium hydroxide/simethicone Suspension 30 milliLiter(s) Oral every 4 hours PRN  amLODIPine   Tablet 5 milliGRAM(s) Oral daily  enoxaparin Injectable 40 milliGRAM(s) SubCutaneous every 24 hours  famotidine    Tablet 20 milliGRAM(s) Oral daily  lactated ringers Bolus 2000 milliLiter(s) IV Bolus once  melatonin 3 milliGRAM(s) Oral at bedtime PRN  ondansetron Injectable 4 milliGRAM(s) IV Push every 8 hours PRN  oxycodone    5 mG/acetaminophen 325 mG 1 Tablet(s) Oral every 6 hours PRN  sodium chloride 0.9%. 1000 milliLiter(s) IV Continuous <Continuous>  tamsulosin 0.4 milliGRAM(s) Oral at bedtime      PMHX/PSHX:  Benign essential HTN    HLD (hyperlipidemia)    BPH (benign prostatic hyperplasia)    No significant past surgical history            ROS: 14 point ROS negative unless otherwise stated in subjective      PHYSICAL EXAM:     GENERAL:  Well developed, no distress  HEENT:  NC/AT,  conjunctivae clear, sclera anicteric  CHEST:  Full & symmetric excursion, no increased effort w/ respirations  HEART:  Regular rhythm & rate  ABDOMEN:  Soft, non-tender, non-distended  EXTREMITIES:  no LE  edema  SKIN:  No rash/erythema/ecchymoses/petechiae/wounds/jaundice  NEURO:  Alert, orientedx3    Vital Signs:  Vital Signs Last 24 Hrs  T(C): 36.5 (27 Feb 2024 05:15), Max: 37 (26 Feb 2024 09:03)  T(F): 97.7 (27 Feb 2024 05:15), Max: 98.6 (26 Feb 2024 09:03)  HR: 58 (27 Feb 2024 05:15) (58 - 72)  BP: 122/84 (27 Feb 2024 05:15) (122/84 - 144/95)  BP(mean): 102 (26 Feb 2024 14:50) (92 - 102)  RR: 17 (27 Feb 2024 05:15) (12 - 18)  SpO2: 99% (27 Feb 2024 05:15) (96% - 100%)    Parameters below as of 27 Feb 2024 05:15  Patient On (Oxygen Delivery Method): room air      Daily Height in cm: 180 (26 Feb 2024 11:10)    Daily     LABS:                        13.5   7.51  )-----------( 232      ( 27 Feb 2024 05:46 )             39.6     02-26    140  |  103  |  20  ----------------------------<  100<H>  4.2   |  25  |  1.21    Ca    9.3      26 Feb 2024 03:17    TPro  7.9  /  Alb  4.3  /  TBili  0.8  /  DBili  x   /  AST  114<H>  /  ALT  304<H>  /  AlkPhos  212<H>  02-26    LIVER FUNCTIONS - ( 26 Feb 2024 03:17 )  Alb: 4.3 g/dL / Pro: 7.9 g/dL / ALK PHOS: 212 U/L / ALT: 304 U/L / AST: 114 U/L / GGT: x           PT/INR - ( 26 Feb 2024 03:17 )   PT: 10.7 sec;   INR: 0.95 ratio         PTT - ( 26 Feb 2024 03:17 )  PTT:39.6 sec  Urinalysis Basic - ( 26 Feb 2024 03:17 )    Color: x / Appearance: x / SG: x / pH: x  Gluc: 100 mg/dL / Ketone: x  / Bili: x / Urobili: x   Blood: x / Protein: x / Nitrite: x   Leuk Esterase: x / RBC: x / WBC x   Sq Epi: x / Non Sq Epi: x / Bacteria: x          Imaging: No new abdominal imaging

## 2024-02-27 NOTE — CONSULT NOTE ADULT - ATTENDING COMMENTS
The patient was seen and examined, chart and notes reviewed.  The current diagnosis, plan of care and alternatives have been discussed with the patient.  All questions have been answered and updates have been discussed.  The case was discussed with B team residents/PA's and medical students at morning B surgical rounds and throughout the course of the day.    Symptomatic cholelithiasis/ GS PANCREATITIS  a.  S/P ERCP.  Transfer to surgery B/[facility]  b.  Keep NPO at this time  c.  Continue IVF resuscitation  d.  Plan for laparoscopic cholecystectomy
Impression:  # Choledocholithiasis    Plan:  ERCP tentatively for 2/26

## 2024-02-27 NOTE — DISCHARGE NOTE PROVIDER - NSDCFUADDAPPT_GEN_ALL_CORE_FT
You will need an outpatient abdominal x-ray to check for stent placement within 2 weeks. If the stent is still in place you will need an endoscopy for stent removal within 4 weeks.  Please be sure to follow up with gastroenterology to coordinate this.

## 2024-02-27 NOTE — PROGRESS NOTE ADULT - SUBJECTIVE AND OBJECTIVE BOX
Patient is a 63y old  Male who presents with a chief complaint of choledocholithiasis (27 Feb 2024 08:12)      SUBJECTIVE / OVERNIGHT EVENTS:    MEDICATIONS  (STANDING):  amLODIPine   Tablet 5 milliGRAM(s) Oral daily  enoxaparin Injectable 40 milliGRAM(s) SubCutaneous every 24 hours  famotidine    Tablet 20 milliGRAM(s) Oral daily  lactated ringers Bolus 2000 milliLiter(s) IV Bolus once  sodium chloride 0.9%. 1000 milliLiter(s) (75 mL/Hr) IV Continuous <Continuous>  tamsulosin 0.4 milliGRAM(s) Oral at bedtime    MEDICATIONS  (PRN):  acetaminophen     Tablet .. 650 milliGRAM(s) Oral every 6 hours PRN Temp greater or equal to 38C (100.4F), Mild Pain (1 - 3)  aluminum hydroxide/magnesium hydroxide/simethicone Suspension 30 milliLiter(s) Oral every 4 hours PRN Dyspepsia  melatonin 3 milliGRAM(s) Oral at bedtime PRN Insomnia  ondansetron Injectable 4 milliGRAM(s) IV Push every 8 hours PRN Nausea and/or Vomiting  oxycodone    5 mG/acetaminophen 325 mG 1 Tablet(s) Oral every 6 hours PRN Severe Pain (7 - 10)      Vital Signs Last 24 Hrs  T(C): 36.6 (27 Feb 2024 09:54), Max: 36.6 (26 Feb 2024 13:50)  T(F): 97.9 (27 Feb 2024 09:54), Max: 97.9 (26 Feb 2024 13:50)  HR: 58 (27 Feb 2024 09:54) (58 - 63)  BP: 120/82 (27 Feb 2024 09:54) (120/82 - 137/91)  BP(mean): 102 (26 Feb 2024 14:50) (92 - 102)  RR: 18 (27 Feb 2024 09:54) (12 - 18)  SpO2: 98% (27 Feb 2024 09:54) (96% - 99%)    Parameters below as of 27 Feb 2024 05:15  Patient On (Oxygen Delivery Method): room air      CAPILLARY BLOOD GLUCOSE      POCT Blood Glucose.: 104 mg/dL (26 Feb 2024 14:40)    I&O's Summary    26 Feb 2024 07:01  -  27 Feb 2024 07:00  --------------------------------------------------------  IN: 1050 mL / OUT: 1000 mL / NET: 50 mL    27 Feb 2024 07:01  -  27 Feb 2024 13:30  --------------------------------------------------------  IN: 0 mL / OUT: 0 mL / NET: 0 mL        PHYSICAL EXAM:  GENERAL: NAD, well-developed  HEAD:  Atraumatic, Normocephalic  EYES: EOMI, PERRLA, conjunctiva and sclera clear  NECK: Supple, No JVD  CHEST/LUNG: Clear to auscultation bilaterally; No wheeze  HEART: Regular rate and rhythm; No murmurs, rubs, or gallops  ABDOMEN: Soft, Nontender, Nondistended; Bowel sounds present  EXTREMITIES:  2+ Peripheral Pulses, No clubbing, cyanosis, or edema  PSYCH: AAOx3  NEUROLOGY: non-focal  SKIN: No rashes or lesions    LABS:                        13.5   7.51  )-----------( 232      ( 27 Feb 2024 05:46 )             39.6     02-27    137  |  101  |  19  ----------------------------<  100<H>  4.0   |  23  |  1.02    Ca    9.0      27 Feb 2024 05:46  Phos  4.0     02-27  Mg     2.10     02-27    TPro  7.0  /  Alb  4.0  /  TBili  0.8  /  DBili  0.3  /  AST  124<H>  /  ALT  280<H>  /  AlkPhos  177<H>  02-27    PT/INR - ( 26 Feb 2024 03:17 )   PT: 10.7 sec;   INR: 0.95 ratio         PTT - ( 26 Feb 2024 03:17 )  PTT:39.6 sec      Urinalysis Basic - ( 27 Feb 2024 05:46 )    Color: x / Appearance: x / SG: x / pH: x  Gluc: 100 mg/dL / Ketone: x  / Bili: x / Urobili: x   Blood: x / Protein: x / Nitrite: x   Leuk Esterase: x / RBC: x / WBC x   Sq Epi: x / Non Sq Epi: x / Bacteria: x        RADIOLOGY & ADDITIONAL TESTS:    Imaging Personally Reviewed:    Consultant(s) Notes Reviewed:      Care Discussed with Consultants/Other Providers:   Patient is a 63y old  Male who presents with a chief complaint of choledocholithiasis (27 Feb 2024 08:12)      SUBJECTIVE / OVERNIGHT EVENTS:  Patient has no new complaints. Denies cp, SOB, abdominal pain, N/V/D. [facility]  # [id number] provided translation.     MEDICATIONS  (STANDING):  amLODIPine   Tablet 5 milliGRAM(s) Oral daily  enoxaparin Injectable 40 milliGRAM(s) SubCutaneous every 24 hours  famotidine    Tablet 20 milliGRAM(s) Oral daily  lactated ringers Bolus 2000 milliLiter(s) IV Bolus once  sodium chloride 0.9%. 1000 milliLiter(s) (75 mL/Hr) IV Continuous <Continuous>  tamsulosin 0.4 milliGRAM(s) Oral at bedtime    MEDICATIONS  (PRN):  acetaminophen     Tablet .. 650 milliGRAM(s) Oral every 6 hours PRN Temp greater or equal to 38C (100.4F), Mild Pain (1 - 3)  aluminum hydroxide/magnesium hydroxide/simethicone Suspension 30 milliLiter(s) Oral every 4 hours PRN Dyspepsia  melatonin 3 milliGRAM(s) Oral at bedtime PRN Insomnia  ondansetron Injectable 4 milliGRAM(s) IV Push every 8 hours PRN Nausea and/or Vomiting  oxycodone    5 mG/acetaminophen 325 mG 1 Tablet(s) Oral every 6 hours PRN Severe Pain (7 - 10)      Vital Signs Last 24 Hrs  T(C): 36.6 (27 Feb 2024 09:54), Max: 36.6 (26 Feb 2024 13:50)  T(F): 97.9 (27 Feb 2024 09:54), Max: 97.9 (26 Feb 2024 13:50)  HR: 58 (27 Feb 2024 09:54) (58 - 63)  BP: 120/82 (27 Feb 2024 09:54) (120/82 - 137/91)  BP(mean): 102 (26 Feb 2024 14:50) (92 - 102)  RR: 18 (27 Feb 2024 09:54) (12 - 18)  SpO2: 98% (27 Feb 2024 09:54) (96% - 99%)    Parameters below as of 27 Feb 2024 05:15  Patient On (Oxygen Delivery Method): room air      CAPILLARY BLOOD GLUCOSE      POCT Blood Glucose.: 104 mg/dL (26 Feb 2024 14:40)    I&O's Summary    26 Feb 2024 07:01  -  27 Feb 2024 07:00  --------------------------------------------------------  IN: 1050 mL / OUT: 1000 mL / NET: 50 mL    27 Feb 2024 07:01  -  27 Feb 2024 13:30  --------------------------------------------------------  IN: 0 mL / OUT: 0 mL / NET: 0 mL        PHYSICAL EXAM:  GENERAL: NAD, well-developed  HEAD:  Atraumatic, Normocephalic  EYES: EOMI, PERRLA, conjunctiva and sclera clear  NECK: Supple, No JVD  CHEST/LUNG: Clear to auscultation bilaterally; No wheeze  HEART: Regular rate and rhythm; No murmurs, rubs, or gallops  ABDOMEN: Soft, Nontender, Nondistended; Bowel sounds present  EXTREMITIES:  2+ Peripheral Pulses, No clubbing, cyanosis, or edema  PSYCH: AAOx3  NEUROLOGY: non-focal  SKIN: No rashes or lesions    LABS:                        13.5   7.51  )-----------( 232      ( 27 Feb 2024 05:46 )             39.6     02-27    137  |  101  |  19  ----------------------------<  100<H>  4.0   |  23  |  1.02    Ca    9.0      27 Feb 2024 05:46  Phos  4.0     02-27  Mg     2.10     02-27    TPro  7.0  /  Alb  4.0  /  TBili  0.8  /  DBili  0.3  /  AST  124<H>  /  ALT  280<H>  /  AlkPhos  177<H>  02-27    PT/INR - ( 26 Feb 2024 03:17 )   PT: 10.7 sec;   INR: 0.95 ratio         PTT - ( 26 Feb 2024 03:17 )  PTT:39.6 sec      Urinalysis Basic - ( 27 Feb 2024 05:46 )    Color: x / Appearance: x / SG: x / pH: x  Gluc: 100 mg/dL / Ketone: x  / Bili: x / Urobili: x   Blood: x / Protein: x / Nitrite: x   Leuk Esterase: x / RBC: x / WBC x   Sq Epi: x / Non Sq Epi: x / Bacteria: x        RADIOLOGY & ADDITIONAL TESTS:    Imaging Personally Reviewed:    Consultant(s) Notes Reviewed:      Care Discussed with Consultants/Other Providers:

## 2024-02-27 NOTE — DISCHARGE NOTE PROVIDER - NSDCMRMEDTOKEN_GEN_ALL_CORE_FT
AMLODIPINE BESYLATE 5 MG TAB: 1 tab(s) orally once a day  ATORVASTATIN 20 MG TABLET: 1 tab(s) orally once a day  FAMOTIDINE 20 MG TABLET: 1 tab(s) orally once a day  TAMSULOSIN HCL 0.4 MG CAPSULE: 1 tab(s) orally once a day

## 2024-02-27 NOTE — DISCHARGE NOTE PROVIDER - PROVIDER TOKENS
PROVIDER:[TOKEN:[[id number]],FOLLOWUP:[2 weeks]],PROVIDER:[TOKEN:[[id number]],FOLLOWUP:[2 weeks]]

## 2024-02-27 NOTE — DISCHARGE NOTE PROVIDER - CARE PROVIDER_API CALL
[facility]  Surgery  [id number] [place] [id number]  Phone: [phone]  Fax: [phone]  Follow Up Time: 2 weeks    Michael Nielson  Gastroenterology  [address] [place] [address] [address]  [place] [address]  Phone: [phone]  Fax: [phone]  Follow Up Time: 2 weeks

## 2024-02-27 NOTE — PROGRESS NOTE ADULT - PROBLEM SELECTOR PLAN 1
- CT A/P notable for distal CBD stone ( 5mm)  - s/p ERCP on 2/26 and sphincterotomy, balloon extraction of stone, and pancreatic stent placement.   -LFTs now trending downward  -advance diet as tolerated  -Will consult gen surg for inpatient cholecystectomy  -needs OP XR abd in 2 weeks for PD stent position check, if still in place needs EGD for PD stent removal in 4 weeks. - CT A/P notable for distal CBD stone ( 5mm)  - s/p ERCP on 2/26 and sphincterotomy, balloon extraction of stone, and pancreatic stent placement.   -LFTs now trending downward  -advance diet as tolerated  -Consulted gen surg for inpatient cholecystectomy per GI request  -needs OP XR abd in 2 weeks for PD stent position check, if still in place needs EGD for PD stent removal in 4 weeks.

## 2024-02-27 NOTE — DISCHARGE NOTE PROVIDER - CARE PROVIDERS DIRECT ADDRESSES
[email or web address]

## 2024-02-27 NOTE — DISCHARGE NOTE PROVIDER - NSDCCPTREATMENT_GEN_ALL_CORE_FT
PRINCIPAL PROCEDURE  Procedure: Laparoscopic cholecystectomy  Findings and Treatment:       SECONDARY PROCEDURE  Procedure: ERCP with sphincterotomy  Findings and Treatment:

## 2024-02-27 NOTE — CONSULT NOTE ADULT - SUBJECTIVE AND OBJECTIVE BOX
SURGERY CONSULT NOTE    Patient is a 63y old  Male who presents with a chief complaint of choledocholithiasis (27 Feb 2024 13:29)    HPI:  Transfer from [place] for GI evaluation   63 yr old male with a pmh of HTN, HLD BPH who presents with upper abdominal pain/epigastric pain that started at 1am "exploding" in nature 10 in nature radiating to the rest of his abdomen. He has associated SOB with the pain.   Denies  headache, dizziness, chest pain, palpitations, joint pain, diarrhea/constipation, urinary symptoms.  Vitals: T 99.3, HR 68, /95, RR 18 satting 99% RA  (23 Feb 2024 20:12)    Patient taken for ERCP, sphincterotomy, balloon extraction of stone, and pancreatic stent placement yesterday and surgery consult requested today.     PAST MEDICAL & SURGICAL HISTORY:  Benign essential HTN      HLD (hyperlipidemia)      BPH (benign prostatic hyperplasia)      No significant past surgical history        [  ] No significant past history as reviewed with the patient and family    FAMILY HISTORY:  No pertinent family history in first degree relatives      [  ] Family history not pertinent as reviewed with the patient and family    SOCIAL HISTORY:    MEDICATIONS  (STANDING):  amLODIPine   Tablet 5 milliGRAM(s) Oral daily  enoxaparin Injectable 40 milliGRAM(s) SubCutaneous every 24 hours  famotidine    Tablet 20 milliGRAM(s) Oral daily  tamsulosin 0.4 milliGRAM(s) Oral at bedtime    MEDICATIONS  (PRN):  acetaminophen     Tablet .. 650 milliGRAM(s) Oral every 6 hours PRN Temp greater or equal to 38C (100.4F), Mild Pain (1 - 3)  aluminum hydroxide/magnesium hydroxide/simethicone Suspension 30 milliLiter(s) Oral every 4 hours PRN Dyspepsia  melatonin 3 milliGRAM(s) Oral at bedtime PRN Insomnia  ondansetron Injectable 4 milliGRAM(s) IV Push every 8 hours PRN Nausea and/or Vomiting  oxycodone    5 mG/acetaminophen 325 mG 1 Tablet(s) Oral every 6 hours PRN Severe Pain (7 - 10)    Allergies    No Known Allergies    Intolerances        Vital Signs Last 24 Hrs  T(C): 36.8 (27 Feb 2024 18:15), Max: 36.8 (27 Feb 2024 18:15)  T(F): 98.2 (27 Feb 2024 18:15), Max: 98.2 (27 Feb 2024 18:15)  HR: 60 (27 Feb 2024 18:15) (58 - 63)  BP: 125/78 (27 Feb 2024 18:15) (114/80 - 137/88)  BP(mean): --  RR: 18 (27 Feb 2024 18:15) (17 - 18)  SpO2: 98% (27 Feb 2024 18:15) (98% - 99%)    Parameters below as of 27 Feb 2024 14:53  Patient On (Oxygen Delivery Method): room air      PHYSICAL EXAM  General: No acute distress, resting comfortably in bed.  HEENT: Normocephalic atraumatic  Respiratory: Nonlabored respirations  Cardio: regular rate  Abdomen: soft, nondistended, nontender  Extremities: warm and well perfused                          13.5   7.51  )-----------( 232      ( 27 Feb 2024 05:46 )             39.6     02-27    137  |  101  |  19  ----------------------------<  100<H>  4.0   |  23  |  1.02    Ca    9.0      27 Feb 2024 05:46  Phos  4.0     02-27  Mg     2.10     02-27    TPro  7.0  /  Alb  4.0  /  TBili  0.8  /  DBili  0.3  /  AST  124<H>  /  ALT  280<H>  /  AlkPhos  177<H>  02-27    PT/INR - ( 26 Feb 2024 03:17 )   PT: 10.7 sec;   INR: 0.95 ratio         PTT - ( 26 Feb 2024 03:17 )  PTT:39.6 sec  Urinalysis Basic - ( 27 Feb 2024 05:46 )    Color: x / Appearance: x / SG: x / pH: x  Gluc: 100 mg/dL / Ketone: x  / Bili: x / Urobili: x   Blood: x / Protein: x / Nitrite: x   Leuk Esterase: x / RBC: x / WBC x   Sq Epi: x / Non Sq Epi: x / Bacteria: x        IMAGING STUDIES:  < from: US Abdomen Upper Quadrant Right (02.23.24 @ 10:37) >  FINDINGS:  Liver: Within normal limits.  Bile ducts: Normal caliber. Common bile duct measures 5 mm. The   downstream common bile duct in the region of the ampulla and not seen   secondary to overlying bowel gas.  Gallbladder: Within normal limits.  Pancreas: Limited. Visualized portions are within normal limits.  Right kidney: 10.7 cm. No hydronephrosis.  Ascites: None.  IVC andaorta: Visualized portions normal in caliber.    IMPRESSION:    Downstream common bile duct which contained a stone on the CT earlier the   same day is not seen on ultrasound secondary to overlying bowel gas.    Visualized common bile duct within normal caliber measuring 5 mm.    --- End of Report ---    < end of copied text >  < from: CT Abdomen and Pelvis w/ IV Cont (02.23.24 @ 06:07) >    IMPRESSION:  Hepatic steatosis.  5 mm distal CBD stone with minimal biliary dilatation.    < end of copied text >

## 2024-02-27 NOTE — PROGRESS NOTE ADULT - ASSESSMENT
62 yo Cantonese speaking M PMH HLD, HTN p/w acute onset epigastric pain x1 day to [facility] 2/23. Noted to have normal WBC, Normal Tbili/ALP; , . CT A/P notable for distal CBD stone. Was transferred to [facility] for advanced GI eval.     Impression:  #choledocholithiasis seen on CT A/P w/o e/o cholangitis  - s/p ERCP (2/26)- Choledocholithiasis s/p sphincterotomy, balloon extraction of stone, and pancreatic stent placement. Endosonographic finding of choledocholithiasis and pancreatic parenchymal changes with lobularity and hyperechoic strands. Benign appearing lilia hepatis lymphadenopathy.    Recommendations:  - low fat diet as tolerated for now  - antiemetics and pain control per primary team  - monitor for fevers and maintain low threshold for antibiotics  - monitor CMP, CBC daily  - appreciate surgery input re: eval for CCY  - needs OP XR abd in 2 weeks for PD stent position check, if still in place needs EGD for PD stent removal in 4 weeks (we will arrange OP imaging studies but please include this information in pt's discharge summary)    Contact information for [facility] Advanced GI: [phone]      **THIS NOTE IS NOT FINALIZED UNTIL SIGNED BY THE ATTENDING**    Maya Sanchez MD  GI Fellow, PGY-6  Available via Microsoft Teams    NON-URGENT CONSULTS:  Please email [email or web address]  AT NIGHT AND ON WEEKENDS:  Contact on-call GI fellow via answering service ([phone]) from 5pm-8am and on weekends/holidays  MONDAY-FRIDAY 8AM-5PM:  Pager# [phone] ([facility]) or [phone] ([facility])     64 yo Cantonese speaking M PMH HLD, HTN p/w acute onset epigastric pain x1 day to [facility] 2/23. Noted to have normal WBC, Normal Tbili/ALP; , . CT A/P notable for distal CBD stone. Was transferred to [facility] for advanced GI eval.     Impression:  #choledocholithiasis seen on CT A/P w/o e/o cholangitis  - s/p ERCP (2/26)- Choledocholithiasis s/p sphincterotomy, balloon extraction of stone, and pancreatic stent placement. Endosonographic finding of choledocholithiasis and pancreatic parenchymal changes with lobularity and hyperechoic strands. Benign appearing lilia hepatis lymphadenopathy.    Recommendations:  - low fat diet as tolerated for now  - antiemetics and pain control per primary team  - monitor for fevers and maintain low threshold for antibiotics  - monitor CMP, CBC daily  - appreciate surgery input re: eval for CCY  - needs OP XR abd in 2 weeks for PD stent position check, if still in place needs EGD for PD stent removal in 4 weeks (we will arrange OP imaging studies but please include this information in pt's discharge summary)    Contact information for [facility] GI: [phone]      **THIS NOTE IS NOT FINALIZED UNTIL SIGNED BY THE ATTENDING**    Maya Sanchez MD  GI Fellow, PGY-6  Available via Microsoft Teams    NON-URGENT CONSULTS:  Please email [email or web address]  [email or web address]  AT NIGHT AND ON WEEKENDS:  Contact on-call GI fellow via answering service ([phone]) from 5pm-8am and on weekends/holidays  MONDAY-FRIDAY 8AM-5PM:  Pager# [phone] ([facility]) or [phone] ([facility])     64 yo Cantonese speaking M PMH HLD, HTN p/w acute onset epigastric pain x1 day to [facility] 2/23. Noted to have normal WBC, Normal Tbili/ALP; , . CT A/P notable for distal CBD stone. Was transferred to [facility] for advanced GI eval.     Impression:  #choledocholithiasis seen on CT A/P w/o e/o cholangitis  - s/p ERCP (2/26)- Choledocholithiasis s/p sphincterotomy, balloon extraction of stone, and pancreatic stent placement. Endosonographic finding of choledocholithiasis and pancreatic parenchymal changes with lobularity and hyperechoic strands. Benign appearing lilia hepatis lymphadenopathy.    Recommendations:  - low fat diet as tolerated for now  - antiemetics and pain control per primary team  - monitor for fevers and maintain low threshold for antibiotics  - monitor CMP, CBC daily  - appreciate surgery input re: eval for CCY  - needs OP XR abd in  weeks for PD stent position check, if still in place needs EGD for PD stent removal in 4 weeks (we will arrange OP imaging studies but please include this information in pt's discharge summary)    Contact information for [facility] Advanced GI: [phone]      **THIS NOTE IS NOT FINALIZED UNTIL SIGNED BY THE ATTENDING**    Maya Sanchez MD  GI Fellow, PGY-6  Available via Microsoft Teams    NON-URGENT CONSULTS:  Please email [email or web address]  [email or web address]  AT NIGHT AND ON WEEKENDS:  Contact on-call GI fellow via answering service ([phone]) from 5pm-8am and on weekends/holidays  MONDAY-FRIDAY 8AM-5PM:  Pager# [phone] ([facility]) or [phone] ([facility])

## 2024-02-28 ENCOUNTER — TRANSCRIPTION ENCOUNTER (OUTPATIENT)
Age: 64
End: 2024-02-28

## 2024-02-28 DIAGNOSIS — Z01.818 ENCOUNTER FOR OTHER PREPROCEDURAL EXAMINATION: ICD-10-CM

## 2024-02-28 LAB
ALBUMIN SERPL ELPH-MCNC: 4 G/DL — SIGNIFICANT CHANGE UP (ref 3.3–5)
ALP SERPL-CCNC: 160 U/L — HIGH (ref 40–120)
ALT FLD-CCNC: 276 U/L — HIGH (ref 4–41)
ANION GAP SERPL CALC-SCNC: 12 MMOL/L — SIGNIFICANT CHANGE UP (ref 7–14)
APTT BLD: 36.3 SEC — HIGH (ref 24.5–35.6)
AST SERPL-CCNC: 113 U/L — HIGH (ref 4–40)
BILIRUB SERPL-MCNC: 0.7 MG/DL — SIGNIFICANT CHANGE UP (ref 0.2–1.2)
BLD GP AB SCN SERPL QL: NEGATIVE — SIGNIFICANT CHANGE UP
BUN SERPL-MCNC: 14 MG/DL — SIGNIFICANT CHANGE UP (ref 7–23)
CALCIUM SERPL-MCNC: 8.8 MG/DL — SIGNIFICANT CHANGE UP (ref 8.4–10.5)
CHLORIDE SERPL-SCNC: 103 MMOL/L — SIGNIFICANT CHANGE UP (ref 98–107)
CO2 SERPL-SCNC: 23 MMOL/L — SIGNIFICANT CHANGE UP (ref 22–31)
CREAT SERPL-MCNC: 1.04 MG/DL — SIGNIFICANT CHANGE UP (ref 0.5–1.3)
EGFR: 81 ML/MIN/1.73M2 — SIGNIFICANT CHANGE UP
GLUCOSE SERPL-MCNC: 91 MG/DL — SIGNIFICANT CHANGE UP (ref 70–99)
HCT VFR BLD CALC: 38.8 % — LOW (ref 39–50)
HGB BLD-MCNC: 13.3 G/DL — SIGNIFICANT CHANGE UP (ref 13–17)
INR BLD: 0.97 RATIO — SIGNIFICANT CHANGE UP (ref 0.85–1.18)
MAGNESIUM SERPL-MCNC: 2.3 MG/DL — SIGNIFICANT CHANGE UP (ref 1.6–2.6)
MCHC RBC-ENTMCNC: 30.2 PG — SIGNIFICANT CHANGE UP (ref 27–34)
MCHC RBC-ENTMCNC: 34.3 GM/DL — SIGNIFICANT CHANGE UP (ref 32–36)
MCV RBC AUTO: 88 FL — SIGNIFICANT CHANGE UP (ref 80–100)
NRBC # BLD: 0 /100 WBCS — SIGNIFICANT CHANGE UP (ref 0–0)
NRBC # FLD: 0 K/UL — SIGNIFICANT CHANGE UP (ref 0–0)
PHOSPHATE SERPL-MCNC: 3.2 MG/DL — SIGNIFICANT CHANGE UP (ref 2.5–4.5)
PLATELET # BLD AUTO: 220 K/UL — SIGNIFICANT CHANGE UP (ref 150–400)
POTASSIUM SERPL-MCNC: 3.7 MMOL/L — SIGNIFICANT CHANGE UP (ref 3.5–5.3)
POTASSIUM SERPL-SCNC: 3.7 MMOL/L — SIGNIFICANT CHANGE UP (ref 3.5–5.3)
PROT SERPL-MCNC: 6.7 G/DL — SIGNIFICANT CHANGE UP (ref 6–8.3)
PROTHROM AB SERPL-ACNC: 10.9 SEC — SIGNIFICANT CHANGE UP (ref 9.5–13)
RBC # BLD: 4.41 M/UL — SIGNIFICANT CHANGE UP (ref 4.2–5.8)
RBC # FLD: 12.8 % — SIGNIFICANT CHANGE UP (ref 10.3–14.5)
RH IG SCN BLD-IMP: POSITIVE — SIGNIFICANT CHANGE UP
SODIUM SERPL-SCNC: 138 MMOL/L — SIGNIFICANT CHANGE UP (ref 135–145)
WBC # BLD: 7.18 K/UL — SIGNIFICANT CHANGE UP (ref 3.8–10.5)
WBC # FLD AUTO: 7.18 K/UL — SIGNIFICANT CHANGE UP (ref 3.8–10.5)

## 2024-02-28 PROCEDURE — 99233 SBSQ HOSP IP/OBS HIGH 50: CPT

## 2024-02-28 RX ORDER — SODIUM CHLORIDE 9 MG/ML
1000 INJECTION INTRAMUSCULAR; INTRAVENOUS; SUBCUTANEOUS
Refills: 0 | Status: DISCONTINUED | OUTPATIENT
Start: 2024-02-28 | End: 2024-02-29

## 2024-02-28 RX ORDER — POTASSIUM CHLORIDE 20 MEQ
40 PACKET (EA) ORAL ONCE
Refills: 0 | Status: COMPLETED | OUTPATIENT
Start: 2024-02-28 | End: 2024-02-28

## 2024-02-28 RX ADMIN — Medication 40 MILLIEQUIVALENT(S): at 10:00

## 2024-02-28 RX ADMIN — TAMSULOSIN HYDROCHLORIDE 0.4 MILLIGRAM(S): 0.4 CAPSULE ORAL at 22:16

## 2024-02-28 RX ADMIN — SODIUM CHLORIDE 75 MILLILITER(S): 9 INJECTION INTRAMUSCULAR; INTRAVENOUS; SUBCUTANEOUS at 10:01

## 2024-02-28 RX ADMIN — SODIUM CHLORIDE 75 MILLILITER(S): 9 INJECTION INTRAMUSCULAR; INTRAVENOUS; SUBCUTANEOUS at 00:16

## 2024-02-28 RX ADMIN — AMLODIPINE BESYLATE 5 MILLIGRAM(S): 2.5 TABLET ORAL at 05:29

## 2024-02-28 NOTE — PROGRESS NOTE ADULT - SUBJECTIVE AND OBJECTIVE BOX
B TEAM SURGERY DAILY PROGRESS NOTE    SUBJECTIVE:   Overnight: no acute events   Patient seen and evaluated on AM rounds. Resting comfortably in bed. [facility].  Patient otherwise denies nausea, vomiting, chest pain, shortness of breath     OBJECTIVE:  Vital Signs Last 24 Hrs  T(C): 36.8 (28 Feb 2024 09:38), Max: 36.8 (27 Feb 2024 18:15)  T(F): 98.2 (28 Feb 2024 09:38), Max: 98.2 (27 Feb 2024 18:15)  HR: 59 (28 Feb 2024 09:38) (56 - 60)  BP: 110/87 (28 Feb 2024 09:38) (110/87 - 139/78)  BP(mean): --  RR: 18 (28 Feb 2024 09:38) (16 - 18)  SpO2: 98% (28 Feb 2024 09:38) (98% - 100%)    Parameters below as of 28 Feb 2024 05:20  Patient On (Oxygen Delivery Method): room air      Daily     Daily   CIERA:      Chest Tube:      NG Tube:     STANDING  amLODIPine   Tablet 5 milliGRAM(s) Oral daily  enoxaparin Injectable 40 milliGRAM(s) SubCutaneous every 24 hours  famotidine    Tablet 20 milliGRAM(s) Oral daily  sodium chloride 0.9%. 1000 milliLiter(s) (75 mL/Hr) IV Continuous <Continuous>  tamsulosin 0.4 milliGRAM(s) Oral at bedtime    PRN  acetaminophen     Tablet .. 650 milliGRAM(s) Oral every 6 hours PRN Temp greater or equal to 38C (100.4F), Mild Pain (1 - 3)  aluminum hydroxide/magnesium hydroxide/simethicone Suspension 30 milliLiter(s) Oral every 4 hours PRN Dyspepsia  melatonin 3 milliGRAM(s) Oral at bedtime PRN Insomnia  ondansetron Injectable 4 milliGRAM(s) IV Push every 8 hours PRN Nausea and/or Vomiting  oxycodone    5 mG/acetaminophen 325 mG 1 Tablet(s) Oral every 6 hours PRN Severe Pain (7 - 10)      Labs:  138  |  103  |  14  ----------------------------<  91    (02-28)  3.7   |  23  |  1.04          Ca    8.8      02-28  Mg    2.30  Phos  3.2          Urinalysis Basic - ( 28 Feb 2024 03:19 )    Color: x / Appearance: x / SG: x / pH: x  Gluc: 91 mg/dL / Ketone: x  / Bili: x / Urobili: x   Blood: x / Protein: x / Nitrite: x   Leuk Esterase: x / RBC: x / WBC x   Sq Epi: x / Non Sq Epi: x / Bacteria: x    Urinalysis Basic - ( 28 Feb 2024 03:19 )    Color: x / Appearance: x / SG: x / pH: x  Gluc: 91 mg/dL / Ketone: x  / Bili: x / Urobili: x   Blood: x / Protein: x / Nitrite: x   Leuk Esterase: x / RBC: x / WBC x   Sq Epi: x / Non Sq Epi: x / Bacteria: x    Physical Exam:  General: No acute distress, resting comfortably in bed.  HEENT: Normocephalic atraumatic  Respiratory: Nonlabored respirations  Cardio: regular rate  Abdomen: soft, nondistended, nontender  Extremities: warm and well perfused

## 2024-02-28 NOTE — PROGRESS NOTE ADULT - PROBLEM SELECTOR PLAN 4
Chronic stable  Continue tamsulosin 0.4mg daily
Chronic stable  hold atorvastatin 20mg daily given transaminitis
Chronic stable  Continue tamsulosin 0.4mg daily

## 2024-02-28 NOTE — PROGRESS NOTE ADULT - PROBLEM SELECTOR PROBLEM 2
Benign essential HTN
Preoperative clearance

## 2024-02-28 NOTE — PROGRESS NOTE ADULT - PROBLEM SELECTOR PLAN 2
- c/w norvasc 5 mg PO qd
Cardiovascular Risk Stratification - RCRI =  ()  1. History of ischemic heart disease: no  2. History of congestive heart failure: no  3. History of cerebrovascular disease (stroke or transient ischemic attack): no  4. History of diabetes requiring preoperative insulin use: no  5. Chronic kidney disease (creatinine > 2 mg/dL): no  6. Undergoing suprainguinal vascular, intraperitoneal, or intrathoracic surgery: no  (0 predictors = 0.4%, 1 predictor = 0.9%, 2 predictors = 6.6%, =3 predictors = >11%)    Functional Status:  >4 METS (Description of maximal physical activity)/Unable to Assess.   Patient currently does not show any clinical evidence of decompensated heart failure, cardiac arrythmia, or active ischemia.     Overall this patient is as 0.4% risk (for cardiac death, nonfatal myocardial infarction, and nonfatal cardiac arrest perioperatively for this procedure. According to ACC/AHA 2014 Perioperative guidelines, no further cardiac testing is recommended. May proceed with planned procedure after shared-decision making between the patient or surrogate decision maker and procedure performing physician with regarding the risk, benefits, and alternatives to the procedure.
-BPs stable  - c/w norvasc 5 mg PO qd
-BPs stable  - c/w norvasc 5 mg PO qd
- c/w norvasc 5 mg PO qd

## 2024-02-28 NOTE — PROGRESS NOTE ADULT - PROBLEM/PLAN-2
DISPLAY PLAN FREE TEXT
Statement Selected

## 2024-02-28 NOTE — PROGRESS NOTE ADULT - ASSESSMENT
63M with a pmh of HTN, HLD BPH transferred 2/23 from [place] for GI evaluation now s/p ERCP, sphincterotomy, balloon extraction of stone, and pancreatic stent placement yesterday 2/26 and surgery consulted for cholecystectomy.    Plan/Rec  - Addon for lap cholecystectomy tomorrow  - pre-op labs (BMP, CBC, Coags, Type and Screen) as STAT at 4AM.  - NPO at midnight with maintenance IVF  - Consent in chart  - Patient transferred to surgical service    Surgery Team B  [id number]

## 2024-02-28 NOTE — PROGRESS NOTE ADULT - SUBJECTIVE AND OBJECTIVE BOX
Patient is a 63y old  Male who presents with a chief complaint of choledocholithiasis (27 Feb 2024 21:21)      SUBJECTIVE / OVERNIGHT EVENTS:    MEDICATIONS  (STANDING):  amLODIPine   Tablet 5 milliGRAM(s) Oral daily  enoxaparin Injectable 40 milliGRAM(s) SubCutaneous every 24 hours  famotidine    Tablet 20 milliGRAM(s) Oral daily  sodium chloride 0.9%. 1000 milliLiter(s) (75 mL/Hr) IV Continuous <Continuous>  tamsulosin 0.4 milliGRAM(s) Oral at bedtime    MEDICATIONS  (PRN):  acetaminophen     Tablet .. 650 milliGRAM(s) Oral every 6 hours PRN Temp greater or equal to 38C (100.4F), Mild Pain (1 - 3)  aluminum hydroxide/magnesium hydroxide/simethicone Suspension 30 milliLiter(s) Oral every 4 hours PRN Dyspepsia  melatonin 3 milliGRAM(s) Oral at bedtime PRN Insomnia  ondansetron Injectable 4 milliGRAM(s) IV Push every 8 hours PRN Nausea and/or Vomiting  oxycodone    5 mG/acetaminophen 325 mG 1 Tablet(s) Oral every 6 hours PRN Severe Pain (7 - 10)      Vital Signs Last 24 Hrs  T(C): 36.8 (28 Feb 2024 05:20), Max: 36.8 (27 Feb 2024 18:15)  T(F): 98.2 (28 Feb 2024 05:20), Max: 98.2 (27 Feb 2024 18:15)  HR: 56 (28 Feb 2024 05:20) (56 - 60)  BP: 119/82 (28 Feb 2024 05:20) (114/80 - 139/78)  BP(mean): --  RR: 16 (28 Feb 2024 05:20) (16 - 18)  SpO2: 99% (28 Feb 2024 05:20) (98% - 100%)    Parameters below as of 28 Feb 2024 05:20  Patient On (Oxygen Delivery Method): room air      CAPILLARY BLOOD GLUCOSE        I&O's Summary    27 Feb 2024 07:01  -  28 Feb 2024 07:00  --------------------------------------------------------  IN: 1980 mL / OUT: 1500 mL / NET: 480 mL        PHYSICAL EXAM:  GENERAL: NAD, well-developed  HEAD:  Atraumatic, Normocephalic  EYES: EOMI, PERRLA, conjunctiva and sclera clear  NECK: Supple, No JVD  CHEST/LUNG: Clear to auscultation bilaterally; No wheeze  HEART: Regular rate and rhythm; No murmurs, rubs, or gallops  ABDOMEN: Soft, Nontender, Nondistended; Bowel sounds present  EXTREMITIES:  2+ Peripheral Pulses, No clubbing, cyanosis, or edema  PSYCH: AAOx3  NEUROLOGY: non-focal  SKIN: No rashes or lesions    LABS:                        13.3   7.18  )-----------( 220      ( 28 Feb 2024 03:19 )             38.8     02-28    138  |  103  |  14  ----------------------------<  91  3.7   |  23  |  1.04    Ca    8.8      28 Feb 2024 03:19  Phos  3.2     02-28  Mg     2.30     02-28    TPro  6.7  /  Alb  4.0  /  TBili  0.7  /  DBili  x   /  AST  113<H>  /  ALT  276<H>  /  AlkPhos  160<H>  02-28    PT/INR - ( 28 Feb 2024 03:19 )   PT: 10.9 sec;   INR: 0.97 ratio         PTT - ( 28 Feb 2024 03:19 )  PTT:36.3 sec      Urinalysis Basic - ( 28 Feb 2024 03:19 )    Color: x / Appearance: x / SG: x / pH: x  Gluc: 91 mg/dL / Ketone: x  / Bili: x / Urobili: x   Blood: x / Protein: x / Nitrite: x   Leuk Esterase: x / RBC: x / WBC x   Sq Epi: x / Non Sq Epi: x / Bacteria: x        RADIOLOGY & ADDITIONAL TESTS:    Imaging Personally Reviewed:    Consultant(s) Notes Reviewed:      Care Discussed with Consultants/Other Providers:   Patient is a 63y old  Male who presents with a chief complaint of choledocholithiasis (27 Feb 2024 21:21)      SUBJECTIVE / OVERNIGHT EVENTS:  Patient has no new complaints. Denies cp, SOB, abdominal pain, N/V/D     MEDICATIONS  (STANDING):  amLODIPine   Tablet 5 milliGRAM(s) Oral daily  enoxaparin Injectable 40 milliGRAM(s) SubCutaneous every 24 hours  famotidine    Tablet 20 milliGRAM(s) Oral daily  sodium chloride 0.9%. 1000 milliLiter(s) (75 mL/Hr) IV Continuous <Continuous>  tamsulosin 0.4 milliGRAM(s) Oral at bedtime    MEDICATIONS  (PRN):  acetaminophen     Tablet .. 650 milliGRAM(s) Oral every 6 hours PRN Temp greater or equal to 38C (100.4F), Mild Pain (1 - 3)  aluminum hydroxide/magnesium hydroxide/simethicone Suspension 30 milliLiter(s) Oral every 4 hours PRN Dyspepsia  melatonin 3 milliGRAM(s) Oral at bedtime PRN Insomnia  ondansetron Injectable 4 milliGRAM(s) IV Push every 8 hours PRN Nausea and/or Vomiting  oxycodone    5 mG/acetaminophen 325 mG 1 Tablet(s) Oral every 6 hours PRN Severe Pain (7 - 10)      Vital Signs Last 24 Hrs  T(C): 36.8 (28 Feb 2024 05:20), Max: 36.8 (27 Feb 2024 18:15)  T(F): 98.2 (28 Feb 2024 05:20), Max: 98.2 (27 Feb 2024 18:15)  HR: 56 (28 Feb 2024 05:20) (56 - 60)  BP: 119/82 (28 Feb 2024 05:20) (114/80 - 139/78)  BP(mean): --  RR: 16 (28 Feb 2024 05:20) (16 - 18)  SpO2: 99% (28 Feb 2024 05:20) (98% - 100%)    Parameters below as of 28 Feb 2024 05:20  Patient On (Oxygen Delivery Method): room air      CAPILLARY BLOOD GLUCOSE        I&O's Summary    27 Feb 2024 07:01  -  28 Feb 2024 07:00  --------------------------------------------------------  IN: 1980 mL / OUT: 1500 mL / NET: 480 mL        PHYSICAL EXAM:  GENERAL: NAD, well-developed  HEAD:  Atraumatic, Normocephalic  EYES: EOMI, PERRLA, conjunctiva and sclera clear  NECK: Supple, No JVD  CHEST/LUNG: Clear to auscultation bilaterally; No wheeze  HEART: Regular rate and rhythm; No murmurs, rubs, or gallops  ABDOMEN: Soft, Nontender, Nondistended; Bowel sounds present  EXTREMITIES:  2+ Peripheral Pulses, No clubbing, cyanosis, or edema  PSYCH: AAOx3  NEUROLOGY: non-focal  SKIN: No rashes or lesions    LABS:                        13.3   7.18  )-----------( 220      ( 28 Feb 2024 03:19 )             38.8     02-28    138  |  103  |  14  ----------------------------<  91  3.7   |  23  |  1.04    Ca    8.8      28 Feb 2024 03:19  Phos  3.2     02-28  Mg     2.30     02-28    TPro  6.7  /  Alb  4.0  /  TBili  0.7  /  DBili  x   /  AST  113<H>  /  ALT  276<H>  /  AlkPhos  160<H>  02-28    PT/INR - ( 28 Feb 2024 03:19 )   PT: 10.9 sec;   INR: 0.97 ratio         PTT - ( 28 Feb 2024 03:19 )  PTT:36.3 sec      Urinalysis Basic - ( 28 Feb 2024 03:19 )    Color: x / Appearance: x / SG: x / pH: x  Gluc: 91 mg/dL / Ketone: x  / Bili: x / Urobili: x   Blood: x / Protein: x / Nitrite: x   Leuk Esterase: x / RBC: x / WBC x   Sq Epi: x / Non Sq Epi: x / Bacteria: x        RADIOLOGY & ADDITIONAL TESTS:    Imaging Personally Reviewed:    Consultant(s) Notes Reviewed:      Care Discussed with Consultants/Other Providers:   Patient is a 63y old  Male who presents with a chief complaint of choledocholithiasis (27 Feb 2024 21:21)      SUBJECTIVE / OVERNIGHT EVENTS:  Patient has no new complaints. Denies cp, SOB, abdominal pain, N/V/D. [facility][id number] [facility].     MEDICATIONS  (STANDING):  amLODIPine   Tablet 5 milliGRAM(s) Oral daily  enoxaparin Injectable 40 milliGRAM(s) SubCutaneous every 24 hours  famotidine    Tablet 20 milliGRAM(s) Oral daily  sodium chloride 0.9%. 1000 milliLiter(s) (75 mL/Hr) IV Continuous <Continuous>  tamsulosin 0.4 milliGRAM(s) Oral at bedtime    MEDICATIONS  (PRN):  acetaminophen     Tablet .. 650 milliGRAM(s) Oral every 6 hours PRN Temp greater or equal to 38C (100.4F), Mild Pain (1 - 3)  aluminum hydroxide/magnesium hydroxide/simethicone Suspension 30 milliLiter(s) Oral every 4 hours PRN Dyspepsia  melatonin 3 milliGRAM(s) Oral at bedtime PRN Insomnia  ondansetron Injectable 4 milliGRAM(s) IV Push every 8 hours PRN Nausea and/or Vomiting  oxycodone    5 mG/acetaminophen 325 mG 1 Tablet(s) Oral every 6 hours PRN Severe Pain (7 - 10)      Vital Signs Last 24 Hrs  T(C): 36.8 (28 Feb 2024 05:20), Max: 36.8 (27 Feb 2024 18:15)  T(F): 98.2 (28 Feb 2024 05:20), Max: 98.2 (27 Feb 2024 18:15)  HR: 56 (28 Feb 2024 05:20) (56 - 60)  BP: 119/82 (28 Feb 2024 05:20) (114/80 - 139/78)  BP(mean): --  RR: 16 (28 Feb 2024 05:20) (16 - 18)  SpO2: 99% (28 Feb 2024 05:20) (98% - 100%)    Parameters below as of 28 Feb 2024 05:20  Patient On (Oxygen Delivery Method): room air      CAPILLARY BLOOD GLUCOSE        I&O's Summary    27 Feb 2024 07:01  -  28 Feb 2024 07:00  --------------------------------------------------------  IN: 1980 mL / OUT: 1500 mL / NET: 480 mL        PHYSICAL EXAM:  GENERAL: NAD, well-developed  HEAD:  Atraumatic, Normocephalic  EYES: EOMI, PERRLA, conjunctiva and sclera clear  NECK: Supple, No JVD  CHEST/LUNG: Clear to auscultation bilaterally; No wheeze  HEART: Regular rate and rhythm; No murmurs, rubs, or gallops  ABDOMEN: Soft, Nontender, Nondistended; Bowel sounds present  EXTREMITIES:  2+ Peripheral Pulses, No clubbing, cyanosis, or edema  PSYCH: AAOx3  NEUROLOGY: non-focal  SKIN: No rashes or lesions    LABS:                        13.3   7.18  )-----------( 220      ( 28 Feb 2024 03:19 )             38.8     02-28    138  |  103  |  14  ----------------------------<  91  3.7   |  23  |  1.04    Ca    8.8      28 Feb 2024 03:19  Phos  3.2     02-28  Mg     2.30     02-28    TPro  6.7  /  Alb  4.0  /  TBili  0.7  /  DBili  x   /  AST  113<H>  /  ALT  276<H>  /  AlkPhos  160<H>  02-28    PT/INR - ( 28 Feb 2024 03:19 )   PT: 10.9 sec;   INR: 0.97 ratio         PTT - ( 28 Feb 2024 03:19 )  PTT:36.3 sec      Urinalysis Basic - ( 28 Feb 2024 03:19 )    Color: x / Appearance: x / SG: x / pH: x  Gluc: 91 mg/dL / Ketone: x  / Bili: x / Urobili: x   Blood: x / Protein: x / Nitrite: x   Leuk Esterase: x / RBC: x / WBC x   Sq Epi: x / Non Sq Epi: x / Bacteria: x        RADIOLOGY & ADDITIONAL TESTS:    Imaging Personally Reviewed:    Consultant(s) Notes Reviewed:      Care Discussed with Consultants/Other Providers:

## 2024-02-28 NOTE — PROGRESS NOTE ADULT - PROBLEM SELECTOR PLAN 1
- CT A/P notable for distal CBD stone ( 5mm)  - s/p ERCP on 2/26 and sphincterotomy, balloon extraction of stone, and pancreatic stent placement.   -LFTs now trending downward  -advance diet as tolerated  -Consulted gen surg for inpatient cholecystectomy per GI request  -needs OP XR abd in 2 weeks for PD stent position check, if still in place needs EGD for PD stent removal in 4 weeks. - CT A/P notable for distal CBD stone ( 5mm)  - s/p ERCP on 2/26 and sphincterotomy, balloon extraction of stone, and pancreatic stent placement.   -LFTs now trending downward  -advance diet as tolerated  -Consulted gen surg for inpatient cholecystectomy per GI request and patient for surgery tomorrow  -needs OP XR abd in 2 weeks for PD stent position check, if still in place needs EGD for PD stent removal in 4 weeks as per GI.

## 2024-02-28 NOTE — PROGRESS NOTE ADULT - PROBLEM SELECTOR PROBLEM 3
HLD (hyperlipidemia)
Benign essential HTN
HLD (hyperlipidemia)

## 2024-02-28 NOTE — PROGRESS NOTE ADULT - ASSESSMENT
64 yo Cantonese speaking Male w/ Hx HTN, BPH p/w abdominal pain found to have choledocholithiases. Patient now  s/p ERCP on 2/26 and sphincterotomy, balloon extraction of stone, and pancreatic stent placement.

## 2024-02-28 NOTE — PROGRESS NOTE ADULT - TIME BILLING
Reviewed lab data, radiology results, consultants' recommendations, documentation in [place], discussed with  ACP, interdisciplinary staff and/or intervention were performed.
Reviewed lab data, radiology results, consultants' recommendations, documentation in [place], discussed with family, ACP, interdisciplinary staff and/or intervention were performed.
Reviewed lab data, radiology results, consultants' recommendations, documentation in [place], discussed with family, ACP, interdisciplinary staff and/or intervention were performed.
Reviewed lab data, radiology results, consultants' recommendations, documentation in [place], discussed with family, ACP, interdisciplinary staff and/or intervention were performed.
Reviewed lab data, radiology results, consultants' recommendations, documentation in [place], discussed with family, ACP, interdisciplinary staff and/or intervention were performed.

## 2024-02-28 NOTE — PROGRESS NOTE ADULT - PROBLEM SELECTOR PLAN 3
Chronic stable  hold atorvastatin 20mg daily given transaminitis
-BPs stable  - c/w norvasc 5 mg PO qd
Chronic stable  hold atorvastatin 20mg daily given transaminitis

## 2024-02-29 ENCOUNTER — TRANSCRIPTION ENCOUNTER (OUTPATIENT)
Age: 64
End: 2024-02-29

## 2024-02-29 ENCOUNTER — RESULT REVIEW (OUTPATIENT)
Age: 64
End: 2024-02-29

## 2024-02-29 VITALS
TEMPERATURE: 98 F | RESPIRATION RATE: 16 BRPM | HEART RATE: 82 BPM | SYSTOLIC BLOOD PRESSURE: 127 MMHG | OXYGEN SATURATION: 98 % | DIASTOLIC BLOOD PRESSURE: 94 MMHG

## 2024-02-29 LAB
ALBUMIN SERPL ELPH-MCNC: 3.9 G/DL — SIGNIFICANT CHANGE UP (ref 3.3–5)
ALP SERPL-CCNC: 147 U/L — HIGH (ref 40–120)
ALT FLD-CCNC: 230 U/L — HIGH (ref 4–41)
ANION GAP SERPL CALC-SCNC: 10 MMOL/L — SIGNIFICANT CHANGE UP (ref 7–14)
AST SERPL-CCNC: 81 U/L — HIGH (ref 4–40)
BILIRUB SERPL-MCNC: 0.6 MG/DL — SIGNIFICANT CHANGE UP (ref 0.2–1.2)
BLD GP AB SCN SERPL QL: NEGATIVE — SIGNIFICANT CHANGE UP
BUN SERPL-MCNC: 14 MG/DL — SIGNIFICANT CHANGE UP (ref 7–23)
CALCIUM SERPL-MCNC: 8.8 MG/DL — SIGNIFICANT CHANGE UP (ref 8.4–10.5)
CHLORIDE SERPL-SCNC: 105 MMOL/L — SIGNIFICANT CHANGE UP (ref 98–107)
CO2 SERPL-SCNC: 23 MMOL/L — SIGNIFICANT CHANGE UP (ref 22–31)
CREAT SERPL-MCNC: 1.03 MG/DL — SIGNIFICANT CHANGE UP (ref 0.5–1.3)
EGFR: 82 ML/MIN/1.73M2 — SIGNIFICANT CHANGE UP
GLUCOSE SERPL-MCNC: 119 MG/DL — HIGH (ref 70–99)
HCT VFR BLD CALC: 39.3 % — SIGNIFICANT CHANGE UP (ref 39–50)
HGB BLD-MCNC: 13.3 G/DL — SIGNIFICANT CHANGE UP (ref 13–17)
MAGNESIUM SERPL-MCNC: 2.5 MG/DL — SIGNIFICANT CHANGE UP (ref 1.6–2.6)
MCHC RBC-ENTMCNC: 30 PG — SIGNIFICANT CHANGE UP (ref 27–34)
MCHC RBC-ENTMCNC: 33.8 GM/DL — SIGNIFICANT CHANGE UP (ref 32–36)
MCV RBC AUTO: 88.7 FL — SIGNIFICANT CHANGE UP (ref 80–100)
NRBC # BLD: 0 /100 WBCS — SIGNIFICANT CHANGE UP (ref 0–0)
NRBC # FLD: 0 K/UL — SIGNIFICANT CHANGE UP (ref 0–0)
PHOSPHATE SERPL-MCNC: 3.9 MG/DL — SIGNIFICANT CHANGE UP (ref 2.5–4.5)
PLATELET # BLD AUTO: 208 K/UL — SIGNIFICANT CHANGE UP (ref 150–400)
POTASSIUM SERPL-MCNC: 3.4 MMOL/L — LOW (ref 3.5–5.3)
POTASSIUM SERPL-SCNC: 3.4 MMOL/L — LOW (ref 3.5–5.3)
PROT SERPL-MCNC: 6.9 G/DL — SIGNIFICANT CHANGE UP (ref 6–8.3)
RBC # BLD: 4.43 M/UL — SIGNIFICANT CHANGE UP (ref 4.2–5.8)
RBC # FLD: 12.8 % — SIGNIFICANT CHANGE UP (ref 10.3–14.5)
RH IG SCN BLD-IMP: POSITIVE — SIGNIFICANT CHANGE UP
SODIUM SERPL-SCNC: 138 MMOL/L — SIGNIFICANT CHANGE UP (ref 135–145)
WBC # BLD: 5.54 K/UL — SIGNIFICANT CHANGE UP (ref 3.8–10.5)
WBC # FLD AUTO: 5.54 K/UL — SIGNIFICANT CHANGE UP (ref 3.8–10.5)

## 2024-02-29 PROCEDURE — 47562 LAPAROSCOPIC CHOLECYSTECTOMY: CPT | Mod: GC

## 2024-02-29 PROCEDURE — 88304 TISSUE EXAM BY PATHOLOGIST: CPT | Mod: 26

## 2024-02-29 DEVICE — CLIP APPLIER COVIDIEN ENDOCLIP III 5MM: Type: IMPLANTABLE DEVICE | Status: FUNCTIONAL

## 2024-02-29 RX ORDER — FENTANYL CITRATE 50 UG/ML
25 INJECTION INTRAVENOUS
Refills: 0 | Status: DISCONTINUED | OUTPATIENT
Start: 2024-02-29 | End: 2024-02-29

## 2024-02-29 RX ORDER — ONDANSETRON 8 MG/1
4 TABLET, FILM COATED ORAL ONCE
Refills: 0 | Status: DISCONTINUED | OUTPATIENT
Start: 2024-02-29 | End: 2024-02-29

## 2024-02-29 RX ORDER — SODIUM CHLORIDE 9 MG/ML
1000 INJECTION, SOLUTION INTRAVENOUS
Refills: 0 | Status: DISCONTINUED | OUTPATIENT
Start: 2024-02-29 | End: 2024-02-29

## 2024-02-29 RX ORDER — OXYCODONE HYDROCHLORIDE 5 MG/1
5 TABLET ORAL ONCE
Refills: 0 | Status: DISCONTINUED | OUTPATIENT
Start: 2024-02-29 | End: 2024-02-29

## 2024-02-29 RX ORDER — POTASSIUM CHLORIDE 20 MEQ
10 PACKET (EA) ORAL
Refills: 0 | Status: COMPLETED | OUTPATIENT
Start: 2024-02-29 | End: 2024-02-29

## 2024-02-29 RX ORDER — DEXTROSE MONOHYDRATE, SODIUM CHLORIDE, AND POTASSIUM CHLORIDE 50; .745; 4.5 G/1000ML; G/1000ML; G/1000ML
1000 INJECTION, SOLUTION INTRAVENOUS
Refills: 0 | Status: DISCONTINUED | OUTPATIENT
Start: 2024-02-29 | End: 2024-02-29

## 2024-02-29 RX ADMIN — ENOXAPARIN SODIUM 40 MILLIGRAM(S): 100 INJECTION SUBCUTANEOUS at 05:04

## 2024-02-29 RX ADMIN — Medication 100 MILLIEQUIVALENT(S): at 06:44

## 2024-02-29 RX ADMIN — OXYCODONE HYDROCHLORIDE 5 MILLIGRAM(S): 5 TABLET ORAL at 21:00

## 2024-02-29 RX ADMIN — AMLODIPINE BESYLATE 5 MILLIGRAM(S): 2.5 TABLET ORAL at 05:04

## 2024-02-29 RX ADMIN — OXYCODONE HYDROCHLORIDE 5 MILLIGRAM(S): 5 TABLET ORAL at 20:24

## 2024-02-29 RX ADMIN — SODIUM CHLORIDE 75 MILLILITER(S): 9 INJECTION INTRAMUSCULAR; INTRAVENOUS; SUBCUTANEOUS at 05:04

## 2024-02-29 RX ADMIN — Medication 100 MILLIEQUIVALENT(S): at 09:19

## 2024-02-29 RX ADMIN — SODIUM CHLORIDE 75 MILLILITER(S): 9 INJECTION INTRAMUSCULAR; INTRAVENOUS; SUBCUTANEOUS at 09:19

## 2024-02-29 RX ADMIN — Medication 100 MILLIEQUIVALENT(S): at 13:24

## 2024-02-29 NOTE — ASU DISCHARGE PLAN (ADULT/PEDIATRIC) - FOLLOW UP APPOINTMENTS
[facility], [facility] May also call Recovery Room (PACU) 24/7 @ [phone]/[facility], [facility]

## 2024-02-29 NOTE — BRIEF OPERATIVE NOTE - OPERATION/FINDINGS
Laparoscopic cholecystectomy. Four 5mm ports. Fascia at michael port site closed with 0-vicryl figure of eight x2. Skin closed with monocryl interrupted sutures and dermbabond.

## 2024-02-29 NOTE — PROGRESS NOTE ADULT - SUBJECTIVE AND OBJECTIVE BOX
SURGERY DAILY PROGRESS NOTE    SUBJECTIVE: No acute events overnight Patient seen and evaluated on AM rounds. Pt is resting comfortably in bed with no complaints. NPO for OR today.    OBJECTIVE:  Vital Signs Last 24 Hrs  T(C): 36.3 (29 Feb 2024 06:00), Max: 36.8 (28 Feb 2024 09:38)  T(F): 97.3 (29 Feb 2024 06:00), Max: 98.2 (28 Feb 2024 09:38)  HR: 63 (29 Feb 2024 06:00) (54 - 63)  BP: 124/88 (29 Feb 2024 06:00) (105/76 - 132/90)  BP(mean): --  RR: 17 (29 Feb 2024 06:00) (16 - 18)  SpO2: 100% (29 Feb 2024 06:00) (98% - 100%)    Parameters below as of 29 Feb 2024 06:00  Patient On (Oxygen Delivery Method): room air      I&O's Detail    28 Feb 2024 07:01  -  29 Feb 2024 07:00  --------------------------------------------------------  IN:    IV PiggyBack: 100 mL    Oral Fluid: 360 mL    sodium chloride 0.9%: 1800 mL  Total IN: 2260 mL    OUT:    Voided (mL): 750 mL  Total OUT: 750 mL    Total NET: 1510 mL    Daily   MEDICATIONS  (STANDING):  amLODIPine   Tablet 5 milliGRAM(s) Oral daily  enoxaparin Injectable 40 milliGRAM(s) SubCutaneous every 24 hours  famotidine    Tablet 20 milliGRAM(s) Oral daily  potassium chloride  10 mEq/100 mL IVPB 10 milliEquivalent(s) IV Intermittent every 1 hour  sodium chloride 0.9%. 1000 milliLiter(s) (75 mL/Hr) IV Continuous <Continuous>  tamsulosin 0.4 milliGRAM(s) Oral at bedtime    MEDICATIONS  (PRN):  acetaminophen     Tablet .. 650 milliGRAM(s) Oral every 6 hours PRN Temp greater or equal to 38C (100.4F), Mild Pain (1 - 3)  aluminum hydroxide/magnesium hydroxide/simethicone Suspension 30 milliLiter(s) Oral every 4 hours PRN Dyspepsia  melatonin 3 milliGRAM(s) Oral at bedtime PRN Insomnia  ondansetron Injectable 4 milliGRAM(s) IV Push every 8 hours PRN Nausea and/or Vomiting  oxycodone    5 mG/acetaminophen 325 mG 1 Tablet(s) Oral every 6 hours PRN Severe Pain (7 - 10)      LABS:                        13.3   5.54  )-----------( 208      ( 29 Feb 2024 03:11 )             39.3     02-29    138  |  105  |  14  ----------------------------<  119<H>  3.4<L>   |  23  |  1.03    Ca    8.8      29 Feb 2024 03:11  Phos  3.9     02-29  Mg     2.50     02-29    TPro  6.9  /  Alb  3.9  /  TBili  0.6  /  DBili  x   /  AST  81<H>  /  ALT  230<H>  /  AlkPhos  147<H>  02-29    PT/INR - ( 28 Feb 2024 03:19 )   PT: 10.9 sec;   INR: 0.97 ratio         PTT - ( 28 Feb 2024 03:19 )  PTT:36.3 sec  Urinalysis Basic - ( 29 Feb 2024 03:11 )    Color: x / Appearance: x / SG: x / pH: x  Gluc: 119 mg/dL / Ketone: x  / Bili: x / Urobili: x   Blood: x / Protein: x / Nitrite: x   Leuk Esterase: x / RBC: x / WBC x   Sq Epi: x / Non Sq Epi: x / Bacteria: x    Physical Exam:  General: No acute distress, resting comfortably in bed.  HEENT: Normocephalic atraumatic  Respiratory: Nonlabored respirations  Cardio: regular rate  Abdomen: soft, nondistended, nontender  Extremities: warm and well perfused

## 2024-02-29 NOTE — ASU DISCHARGE PLAN (ADULT/PEDIATRIC) - NS MD DC FALL RISK RISK
For information on Fall & Injury Prevention, visit: [email or web address] OR  [email or web address] OR  https://www.cdc.gov/steadi/patient.html

## 2024-02-29 NOTE — ASU DISCHARGE PLAN (ADULT/PEDIATRIC) - CARE PROVIDER_API CALL
Wilmar Rudolph [facility]  Surgery  [address] [place] [address]  Phone: [phone]  Fax: [phone]  Follow Up Time:    Wilmar Rudolph [facility]  Surgery  [address] [place] [address]  Phone: [phone]  Fax: [phone]  Follow Up Time:     Michael Nielson  Gastroenterology  [address] [place] [address]  Phone: [phone]  Fax: [phone]  Follow Up Time:

## 2024-02-29 NOTE — ASU DISCHARGE PLAN (ADULT/PEDIATRIC) - NURSING INSTRUCTIONS
DO NOT take any Ibuprofen ,Advil or Aleeve until after  _12_ AM . You received Toradol during your operation and it can cause damage if too much is taken within a 24 hour time period.

## 2024-02-29 NOTE — ASU DISCHARGE PLAN (ADULT/PEDIATRIC) - ASU DC SPECIAL INSTRUCTIONSFT
1) ok to shower 24 hours after surgery      -do not soak in water for 3 weeks (no bath, pool, ocean, etc).  ******************************************************************************************   2) There is surgical glue directly on your incisions. Do not rub or peel off. Pat to dry after showering.       - these will come off slowly over time.   ******************************************************************************************   3) Pain Control:      For the next 3-5 days:      Please take these two medicines on a schedule and alternate between the two every 3 hours.       - Tylenol 650 mg every six hours      - Ibuprofen 400 mg every six hours. Take with Food/Drink.        ******************************************************************************************   4) If you have any concerns for wound infection, such as pain, drainage, redness or you develop symptoms like you had with previous Gall Bladder pains, please contact the surgery office.     ******************************************************************************************   5.) No heavy lifting for four (4) weeks. Do not lift anything greater than 20lbs.    ******************************************************************************************   6) We will plan to see you in the clinic for follow-up in ~2 weeks. 1) ok to shower 24 hours after surgery      -do not soak in water for 3 weeks (no bath, pool, ocean, etc).  ******************************************************************************************   2) There is surgical glue directly on your incisions. Do not rub or peel off. Pat to dry after showering.       - these will come off slowly over time.   ******************************************************************************************   3) Pain Control:      For the next 3-5 days:      Please take these two medicines on a schedule and alternate between the two every 3 hours.       - Tylenol 650 mg every six hours      - Ibuprofen 400 mg every six hours. Take with Food/Drink.        ******************************************************************************************   4) If you have any concerns for wound infection, such as pain, drainage, redness or you develop symptoms like you had with previous Gall Bladder pains, please contact the surgery office.     ******************************************************************************************   5.) No heavy lifting for four (4) weeks. Do not lift anything greater than 20lbs.        DIET: Start with Low Fat diet and progress to regular diet.    ******************************************************************************************   6) The surgery team will plan to see you in the clinic for follow-up in ~2 weeks.      Please follow up with Gastroenterology (GI) team for outpatient xray and EGD for stent removal in 4 weeks.    FOLLOWUP:  Wilmar Rudolph or one of his partners  Surgery  [address] [place] [address]  Phone: [phone]  Fax: [phone]  Follow Up Time: 2 weeks    Michael Nielson  Gastroenterology  [address] [place] [address]  Phone: [phone]  Fax: [phone]  Follow Up Time: 4 weeks 1) Ok to shower 24 hours after surgery      -Do not soak in water for 3 weeks (no bath, pool, ocean, etc).  ******************************************************************************************   2) There is surgical glue directly on your incisions. Do not rub or peel off. Pat to dry after showering.       - These will come off slowly over time.   ******************************************************************************************   3) Pain Control:      For the next 3-5 days:      Please take these two medicines on a schedule and alternate between the two every 3 hours.       - Tylenol 650 mg every six hours      - Ibuprofen 400 mg every six hours. Take with Food/Drink.        ******************************************************************************************   4) If you have any concerns for wound infection, such as pain, drainage, redness or you develop symptoms like you had with previous Gall Bladder pains, please contact the surgery office.     ******************************************************************************************   5.) No heavy lifting for four (4) weeks. Do not lift anything greater than 20lbs.        DIET: Start with Low Fat diet and progress to regular diet.    ******************************************************************************************   6) The surgery team will plan to see you in the clinic for follow-up in ~2 weeks.      Please follow up with Gastroenterology (GI) team for outpatient xray and EGD for stent removal in 4 weeks.    FOLLOWUP:  Wilmar Rudolph or one of his partners  Surgery  [address] [place] [address]  Phone: [phone]  Fax: [phone]  Follow Up Time: 2 weeks    Michael Nielson  Gastroenterology  [address] [place] [address]  Phone: [phone]  Fax: [phone]  Follow Up Time: 4 weeks

## 2024-02-29 NOTE — PROGRESS NOTE ADULT - ASSESSMENT
Assessment: 63M with a pmh of HTN, HLD BPH transferred 2/23 from [place] for GI evaluation now s/p ERCP, sphincterotomy, balloon extraction of stone, and pancreatic stent placement on 2/26. Patient transferred to the surgical service yesterday for cholecystectomy.    Plan:  - Add-on for cholecystectomy today  - Consent in chart  - Pre-op labs obtained  - K 3.4, repleted  - NPO, mIVF  - DVT ppx: [facility]    Surgery Team B  [id number]

## 2024-02-29 NOTE — ASU DISCHARGE PLAN (ADULT/PEDIATRIC) - PROVIDER TOKENS
PROVIDER:[TOKEN:[[id number]]] PROVIDER:[TOKEN:[[id number]]],PROVIDER:[TOKEN:[[id number]]]

## 2024-02-29 NOTE — PROGRESS NOTE ADULT - ATTENDING COMMENTS
The patient was seen and examined, chart and notes reviewed.  The current diagnosis, plan of care and alternatives have been discussed with the patient.  All questions have been answered and updates have been discussed.  The case was discussed with B team residents/PA's and medical students at morning B surgical rounds and throughout the course of the day.    Symptomatic cholelithiasis  a. No events overnight  b.  Keep NPO at this time  c.  Continue IVF resuscitation  d. For lap CCY  e.  Agree with note .
The patient was seen and examined, chart and notes reviewed.  The current diagnosis, plan of care and alternatives have been discussed with the patient.  All questions have been answered and updates have been discussed.  The case was discussed with B team residents/PA's and medical students at morning B surgical rounds and throughout the course of the day.    Symptomatic cholelithiasis/ GS PANCREATITIS  a.  S/P ERCP.  No events opvernight  b.  Keep NPO at this time  c.  Continue IVF resuscitation  d.  Plan for laparoscopic cholecystectomy
Patient seen and examined with the GI fellow. I agree with the above assessment and plan. Thank you for allowing us to care for your patient.    Doing well post ERCP. AXR in 4 weeks to see if PD stent has migrated out.

## 2024-02-29 NOTE — PROGRESS NOTE ADULT - REASON FOR ADMISSION
Choledocholithiasis

## 2024-02-29 NOTE — ASU PREOP CHECKLIST - TO WHOM
The patient feels like her splint is rubbing her right at her incision site. She said it was very uncomfortable and feels like the bandage has slipped down. She wanted to know if she could let her  unwrap it and fix it for her. She can be reached at [phone].    Jim RN [address] Jim BOYKIN [address] [place] [address] /// Report given back to Jim BOYKIN - Or postponed for now- keep Patient NPO Jim BOYKIN [address] [place] [address] /// Report given back to Jim BOYKIN - Or postponed for now- keep Patient NPO report fro Jmi BOYKIN

## 2024-03-03 ENCOUNTER — NON-APPOINTMENT (OUTPATIENT)
Age: 64
End: 2024-03-03

## 2024-03-04 DIAGNOSIS — K80.50 CALCULUS OF BILE DUCT W/OUT CHOLANGITIS OR CHOLECYSTITIS W/OUT OBSTRUCTION: ICD-10-CM

## 2024-03-04 PROBLEM — N40.0 BENIGN PROSTATIC HYPERPLASIA WITHOUT LOWER URINARY TRACT SYMPTOMS: Chronic | Status: ACTIVE | Noted: 2024-02-23

## 2024-03-04 PROBLEM — Z00.00 ENCOUNTER FOR PREVENTIVE HEALTH EXAMINATION: Status: ACTIVE | Noted: 2024-03-04

## 2024-03-04 PROBLEM — E78.5 HYPERLIPIDEMIA, UNSPECIFIED: Chronic | Status: ACTIVE | Noted: 2024-02-23

## 2024-03-04 PROBLEM — I10 ESSENTIAL (PRIMARY) HYPERTENSION: Chronic | Status: ACTIVE | Noted: 2024-02-23

## 2024-03-08 LAB — SURGICAL PATHOLOGY STUDY: SIGNIFICANT CHANGE UP

## 2024-03-29 ENCOUNTER — APPOINTMENT (OUTPATIENT)
Dept: RADIOLOGY | Facility: CLINIC | Age: 64
End: 2024-03-29
Payer: COMMERCIAL

## 2024-03-29 PROCEDURE — 74019 RADEX ABDOMEN 2 VIEWS: CPT

## 2024-04-01 ENCOUNTER — NON-APPOINTMENT (OUTPATIENT)
Age: 64
End: 2024-04-01

## 2024-04-12 NOTE — ASU PATIENT PROFILE, ADULT - URINARY CATHETER
[7] : 7 [2] : 2 [Sharp] : sharp [Shooting] : shooting [Nothing helps with pain getting better] : Nothing helps with pain getting better [Full time] : Work status: full time [de-identified] : 43 year old female followed for RT CTS and RT volar ganglion. Cyst has decreased in size. She has been splinting at night for CTS. She reports less numbness since night time splinting. She is reporting pain that shoots in the thumb, mostly in the night.  [] : no [FreeTextEntry1] : right wrist  [de-identified] : wrist splint at night  no

## 2024-04-15 ENCOUNTER — APPOINTMENT (OUTPATIENT)
Dept: GASTROENTEROLOGY | Facility: HOSPITAL | Age: 64
End: 2024-04-15

## 2024-04-15 ENCOUNTER — TRANSCRIPTION ENCOUNTER (OUTPATIENT)
Age: 64
End: 2024-04-15

## 2024-04-15 ENCOUNTER — OUTPATIENT (OUTPATIENT)
Dept: OUTPATIENT SERVICES | Facility: HOSPITAL | Age: 64
LOS: 1 days | Discharge: HOME | End: 2024-04-15
Payer: COMMERCIAL

## 2024-04-15 ENCOUNTER — RESULT REVIEW (OUTPATIENT)
Age: 64
End: 2024-04-15

## 2024-04-15 VITALS
SYSTOLIC BLOOD PRESSURE: 133 MMHG | TEMPERATURE: 98 F | OXYGEN SATURATION: 100 % | RESPIRATION RATE: 12 BRPM | DIASTOLIC BLOOD PRESSURE: 70 MMHG | HEART RATE: 66 BPM

## 2024-04-15 VITALS
RESPIRATION RATE: 20 BRPM | HEART RATE: 65 BPM | DIASTOLIC BLOOD PRESSURE: 69 MMHG | OXYGEN SATURATION: 100 % | SYSTOLIC BLOOD PRESSURE: 110 MMHG

## 2024-04-15 DIAGNOSIS — K80.50 CALCULUS OF BILE DUCT WITHOUT CHOLANGITIS OR CHOLECYSTITIS WITHOUT OBSTRUCTION: ICD-10-CM

## 2024-04-15 PROCEDURE — 88305 TISSUE EXAM BY PATHOLOGIST: CPT | Mod: 26

## 2024-04-15 PROCEDURE — 43239 EGD BIOPSY SINGLE/MULTIPLE: CPT

## 2024-04-15 PROCEDURE — 43247 EGD REMOVE FOREIGN BODY: CPT

## 2024-04-15 RX ORDER — SODIUM CHLORIDE 9 MG/ML
1000 INJECTION, SOLUTION INTRAVENOUS
Refills: 0 | Status: DISCONTINUED | OUTPATIENT
Start: 2024-04-15 | End: 2024-04-29

## 2024-04-15 NOTE — ASU PREOP CHECKLIST - DENTURES
April 7, 2021       Mustapha Rose  [address] [place] [address]        Dear Mr. Rose,    Please review the enclosed prep instructions for your procedure with Dav Collier MD.    Procedure and arrival times may occasionally change. Depending on the facility where your procedure is scheduled, you may receive a phone call prior to confirm those times.    Date of Procedure: May 11, 2021  Someone from the facility will call you 2-3 days prior with your procedure and arrival times.  Location:   [address] (Please check in on the 2nd floor)  [place]  [address]  [phone]                                  COVID TEST INSTRUCTIONS   Date of COVID testing  Bring ID   Date: Tuesday, 5/11/2021  Time:  12:30 pm   Location: [address] [place]  [address]  [phone]    Once you leave the testing area, it's important that you go straight home and remain at home until the day of your procedure to reduce your risk of any new exposure. It is very important to avoid any potential exposure to COVID-19.  Please follow these  instructions until surgery or procedure has been completed.    ·  Do NOT travel out of home      ·  Immediately report any new symptoms or suspected/known exposures to COVID-19 cases to your surgeon office.    · Maintain physical distancing (at least 6 feet) at all times     · Wash hands frequently and thoroughly with soap and water (lather at least 20 seconds) or disinfect with alcohol-based hand  before eating, before touching face/mouth/eyes, after touching shared objects or high touch surfaces.     · Regularly clean cell phones, door handles, light switches, faucet and toilet handles, bathrooms, and kitchen surfaces using household disinfectant or hot soapy water.        If you have any questions, please call the Gastrointestinal (GI) Department.    Thank you,    Julieth [phone]  Surgery   for Dav Collier MD    14 Days Prior to Procedure:  • Hold Phentermine    7 Days Prior to Procedure:  • Do not eat popcorn, seeds, nuts or whole kernel corn.  • Do not take iron supplements or Pepto-Bismol.  • Hold Plavix/Clopidogrel or Pletal/Cilostazol for 5-7 days prior to the procedure   Please contact your cardiologist or Primary Care physician if you have questions or concerns about holding your Plavix or Coumadin or any generic brand.  Purchase prep from pharmacy if not a hospital prep.   · For Diabetics: Contact you primary care physician for recommendations regarding insulin on the day of you procedure.  • See bowel prep instructions below.    3 Days Prior to Procedure:  • If you take Coumadin, Warfarin, Xarelto or Pradaxa, please call your Primary Care Provider/Cardiologist.    Day Before Procedure:  • You may drink clear liquids only the day before your procedure (see clear liquid diet at the end of the letter).  • Do not drink alcoholic beverages for the 24 hours before your procedure.  • Do not take Lomotil, Imodium, Effer-syllium, Metamucil, Citrucel, Konsyl, Hydrocil or FiberCon.  • Mix prep according to the package directions (refrigerate in the morning if needed).  · Do not eat any solids or dairy foods.    Day of Procedure:  • Do not take oral diabetic medication on the day of your procedure.   • You will be instructed during the call from the preadmission nurse at the designated Surgery Center regarding the medications you should take the morning of your procedure. Questions regarding pre-procedure medications should be directed to the Surgery Center listed on the first page of these instructions.    BOWEL PREP INSTRUCTIONS    Bowel Prep with Gatorade/Miralax/Dulcolax:  You will need to purchase:    · 64 ounces of Gatorade (no red, orange, blue, or purple)  · 238 grams of Miralax   · Dulcolax Laxative Tablets (not stool softener or suppository) from any pharmacy. These are tablets  that you will swallow. You will only need 4 tablets so buy a small package. The pharmacist can help you determine that you have the correct medication. This is an over the counter medication, no prescription is needed.    In the morning, the day before the procedure, mix the Miralax into the Gatorade, shake well to mix, and refrigerate to chill.     Colonoscopy scheduled before 1 PM in the afternoon:  · You may have clear liquids for the entire day prior to the procedure. See the attached Clear Liquid Diet Suggestion sheet. (No solid foods until after procedure).  · At 3 PM, take the 4 Dulcolax tablets with 8-12 ounces of clear liquid, even though the box may say to only take 2.  · At 6 PM the evening before the procedure begin drinking 8 oz of the Gatorade/Miralax mixture every 10-15 minutes until completely gone. Using a straw to finish the drink may be helpful.  · The morning of your procedure, only take your heart and/or blood pressure medications with a small sip of water. Do not take any other medications until after the procedure.    Colonoscopy scheduled after 1 pm in the afternoon:  · The day before the procedure you may have a light breakfast.  · After breakfast, begin clear liquids. No solid foods. See attached Clear Liquid Diet Suggestion sheet. Continue with clear liquids until 7 AM the day of the procedure.  · At 4 PM the day before your procedure, take the 4 Dulcolax tablets with 8-12 ounces of clear liquid, even though the box may say to only take 2.  · At 7 PM the evening before the procedure, begin drinking 8 oz of the Gatorade/Miralax mixture every 10-15 minutes until half the mixture is gone.  Using a straw may be helpful. (No solid foods until after procedure.)  · At 7 AM the morning of the procedure, drink 8 oz of the Gatorade/Miralax mixture every 10-15 minutes until gone. Using a straw to finish the drink may be helpful. (No solid foods until after procedure.)  · The morning of your  procedure, only take your heart and/or blood pressure medications with a small sip of water. Hold any other medications until after the procedure.  · Nothing to eat or drink after you have finished the prep.      CLEAR LIQUID DIET    Clear Liquid Diet - You May Consume:  • Tea, coffee (decaf or regular), clear carbonated beverages (Sprite, 7Up, ginger ale)  • Gatorade (no purple, orange, blue, or red)  • Strained fruit juices - no pulp (apple, white grape, lemonade)  • Soups - clear broth or consommé only  • Desserts - gelatin (no purple, orange, blue, or red flavors, and no fruit or toppings)  • Miscellaneous - sugar, honey, syrup, clear hard candy, salt    Clear Liquid Diet - Do Not Consume:  • Dairy - milk drinks/dairy products  • Protein - meat and meat substitutes  • Vegetables  • Fruits or fruit juices with unstrained pulp  • Grains and starches  • Desserts (except gelatins allowed)  • Fats    ADDITIONAL INSTRUCTIONS    · Empty Bowel: In order to proceed with your procedure, your bowels must be empty.  If your doctor is not able to clearly view your colon and you will risk cancellation or rescheduling of your procedure. Your stools should have a clear to see-through, cloudy, yellow appearance with no formed substance. If your stools are darker or have formed substance, please call the location where your procedure is scheduled to be done and ask for the pre-op nurse, who will get further instructions from your physician.  · Should you experience rectal irritation due to frequent stooling, you may apply Desitin, Balmex, Vitamin A&D ointment, or other similar product to the irritated area.  · Transportation:  A responsible family member/friend must come with you to your procedure and remain in the procedure area until you are discharged. You are not allowed to drive, take a taxi, ride a bus, or leave the procedure center alone. This is due to the sedation that you will receive during your procedure, and is for  your safety as well as the safety of others.  · Do not plan on going to work or doing anything for the rest of the procedure day. You may resume eating/drinking with limitations following your procedure, do not consume alcohol.  · After the procedure: It is recommended that you have a friend or family member stay with you overnight for your safety.  · Pre-Procedure Testing: Some patients may need to complete pre-procedure testing prior to their procedure date as part of the surgery center's requirements.  You will be notified of any required tests, orders will be entered into our electronic system and you can go to any [facility] lab to have the testing completed. No appointment is necessary for the lab and you do not need to be fasting.    Thank you for choosing [facility]   no

## 2024-04-15 NOTE — ASU DISCHARGE PLAN (ADULT/PEDIATRIC) - FOLLOW UP APPOINTMENTS
[facility], Endoscopy Unit Class I (easy) - visualization of the soft palate, fauces, uvula, and both anterior and posterior pillars

## 2024-04-15 NOTE — PRE PROCEDURE NOTE - PRE PROCEDURE EVALUATION
Attending Physician:   Dr. Chen    Procedure: EGD    Indication for Procedure: Stent removal    ________________________________________________________  PAST MEDICAL & SURGICAL HISTORY:  Benign essential HTN      HLD (hyperlipidemia)      BPH (benign prostatic hyperplasia)      No significant past surgical history        ALLERGIES:  No Known Allergies    HOME MEDICATIONS:  AMLODIPINE BESYLATE 5 MG TAB: 1 tab(s) orally once a day  ATORVASTATIN 20 MG TABLET: 1 tab(s) orally once a day  FAMOTIDINE 20 MG TABLET: 1 tab(s) orally once a day  TAMSULOSIN HCL 0.4 MG CAPSULE: 1 tab(s) orally once a day    AICD/PPM: [ ] yes   [x ] no    PERTINENT LAB DATA:                      PHYSICAL EXAMINATION:    T(C): 36.4  HR: 66  BP: 133/70  RR: 12  SpO2: 100%    Constitutional: NAD  HEENT: PERRLA, EOMI,    Neck:  No JVD  Respiratory: CTAB/L  Cardiovascular: S1 and S2  Gastrointestinal: BS+, soft, NT/ND  Extremities: No peripheral edema  Neurological: A/O x 3, no focal deficits  Psychiatric: Normal mood, normal affect  Skin: No rashes    ASA Class: I [ ]  II [ ]  III [ x]  IV [ ]    COMMENTS:    The patient is a suitable candidate for the planned procedure unless box checked [ ]  No, explain:  Interview with Cantonese phone .  I explained the risks (bleeding, infection, perforation, CV risk, anesthesia risk)/b/a with the patient. The patient agrees to proceed. Patient had opportunity to ask questions in preprocedure bay 3. All questions answered.

## 2024-04-22 LAB — SURGICAL PATHOLOGY STUDY: SIGNIFICANT CHANGE UP

## 2025-03-19 NOTE — PRE-OP CHECKLIST - MUPIRONCIN COMMENTS
Specialty Pharmacy Patient Management Program  Cardiology Specialty Pharmacy Refill Outreach      [place] is a 83 y.o. male contacted today regarding refills of his medication(s).    Specialty medication(s) and dose(s) confirmed: Repatha 140 mg SC every 14 days.    Other medications being refilled: n/a    Refill Questions      Flowsheet Row Most Recent Value   Changes to allergies? No   Changes to medications? No   New conditions or infections since last clinic visit No   Unplanned office visit, urgent care, ED, or hospital admission in the last 4 weeks  No   How does patient/caregiver feel medication is working? Very good   Financial problems or insurance changes  No   Since the previous refill, were any specialty medication doses or scheduled injections missed or delayed?  No   Does this patient require a clinical escalation to a pharmacist? No            Delivery Questions      Flowsheet Row Most Recent Value   Delivery method [employer]   Delivery address verified with patient/caregiver? Yes   Delivery address Home   Other address preferred n/a   Number of medications in delivery 1   Medication(s) being filled and delivered Evolocumab (REPATHA)   Doses left of specialty medications 0   Copay verified? Yes   Copay amount 0.00   Copay form of payment No copayment ($0)   Delivery Date Selection 03/20/25   Signature Required No                   Follow-up: 84 days     Lorri Yousif CPhT  Pharmacy Care Coordinator  3/19/2025  09:48 EDT     no orders

## (undated) DEVICE — DRSG CURITY GAUZE SPONGE 4 X 4" 12-PLY NON-STERILE

## (undated) DEVICE — DRSG BANDAID 0.75X3"

## (undated) DEVICE — TUBING OLYMPUS INSUFFLATION

## (undated) DEVICE — SUT VICRYL 0 27" UR-6

## (undated) DEVICE — DRSG 2X2

## (undated) DEVICE — SYR LUER LOK 20CC

## (undated) DEVICE — TUBING IV SET GRAVITY 3Y 100" MACRO

## (undated) DEVICE — BASIN EMESIS 10IN GRADUATED MAUVE

## (undated) DEVICE — BITE BLOCK ADULT 20 X 27MM (GREEN)

## (undated) DEVICE — INJ SYS RAP REFIL

## (undated) DEVICE — PACK GENERAL LAPAROSCOPY

## (undated) DEVICE — DRAPE TOWEL BLUE 17" X 24"

## (undated) DEVICE — SALIVA EJECTOR (BLUE)

## (undated) DEVICE — SOL INJ NS 0.9% 500ML 1-PORT

## (undated) DEVICE — ELCTR ECG CONDUCTIVE ADHESIVE

## (undated) DEVICE — GOWN LG

## (undated) DEVICE — DENTURE CUP PINK

## (undated) DEVICE — SOL IRR POUR NS 0.9% 1000ML

## (undated) DEVICE — POSITIONER STRAP ARMBOARD VELCRO TS-30

## (undated) DEVICE — TROCAR COVIDIEN VERSAONE FIXATION CANNULA 5MM

## (undated) DEVICE — GLV 7 PROTEXIS (WHITE)

## (undated) DEVICE — SENSOR O2 FINGER ADULT

## (undated) DEVICE — BIOPSY FORCEP RADIAL JAW 4 STANDARD WITH NEEDLE

## (undated) DEVICE — PACK IV START WITH CHG

## (undated) DEVICE — TUBING MEDI-VAC W MAXIGRIP CONNECTORS 1/4"X6'

## (undated) DEVICE — DRSG STERISTRIPS 0.5 X 4"

## (undated) DEVICE — ELCTR BOVIE TIP BLADE INSULATED 2.75" EDGE

## (undated) DEVICE — PROTECTOR HEEL / ELBOW

## (undated) DEVICE — TIP METZENBAUM SCISSOR MONOPOLAR ENDOCUT (ORANGE)

## (undated) DEVICE — BASIN SET SINGLE

## (undated) DEVICE — ELCTR GROUNDING PAD ADULT COVIDIEN

## (undated) DEVICE — WARMING BLANKET UPPER ADULT

## (undated) DEVICE — TUBING SUCTION NONCONDUCTIVE 6MM X 12FT

## (undated) DEVICE — DEVICE GRASPING RAPTOR

## (undated) DEVICE — CATH IV SAFE BC 22G X 1" (BLUE)

## (undated) DEVICE — BLADE SURGICAL #15 CARBON

## (undated) DEVICE — CLAMP BX HOT RAD JAW 3

## (undated) DEVICE — CONTAINER FORMALIN 80ML YELLOW

## (undated) DEVICE — OMNIPAQUE 300  30ML

## (undated) DEVICE — SYR LUER LOK 10CC

## (undated) DEVICE — LUBRICATING JELLY HR ONE SHOT 3G

## (undated) DEVICE — ELCTR BOVIE PENCIL SMOKE EVACUATION

## (undated) DEVICE — UNDERPAD LINEN SAVER 17 X 24"

## (undated) DEVICE — TROCAR COVIDIEN BLUNT TIP HASSAN 10MM STANDARD

## (undated) DEVICE — DRAPE 3/4 SHEET 52X76"

## (undated) DEVICE — SOL IRR POUR NS 0.9% 500ML

## (undated) DEVICE — SUT MONOCRYL 4-0 27" PS-2 UNDYED

## (undated) DEVICE — GLV 7.5 PROTEXIS (BLUE)

## (undated) DEVICE — SYR LUER LOK 3CC

## (undated) DEVICE — NDL HYPO SAFE 22G X 1" (BLACK)

## (undated) DEVICE — CONTAINER FORMALIN 10% 20ML

## (undated) DEVICE — BIOPSY FORCEP COLD DISP

## (undated) DEVICE — TROCAR COVIDIEN VERSAPORT BLADELESS OPTICAL 5MM STANDARD

## (undated) DEVICE — D HELP - CLEARVIEW CLEARIFY SYSTEM

## (undated) DEVICE — DISSECTOR ENDOSCOPIC KITTNER SINGLE TIP

## (undated) DEVICE — SOL IRR POUR H2O 500ML

## (undated) DEVICE — VENODYNE/SCD SLEEVE CALF MEDIUM